# Patient Record
Sex: FEMALE | Race: WHITE | Employment: OTHER | URBAN - METROPOLITAN AREA
[De-identification: names, ages, dates, MRNs, and addresses within clinical notes are randomized per-mention and may not be internally consistent; named-entity substitution may affect disease eponyms.]

---

## 2020-08-06 ENCOUNTER — APPOINTMENT (OUTPATIENT)
Dept: GENERAL RADIOLOGY | Age: 70
End: 2020-08-06
Payer: MEDICARE

## 2020-08-06 ENCOUNTER — HOSPITAL ENCOUNTER (EMERGENCY)
Age: 70
Discharge: HOME | End: 2020-08-06
Payer: MEDICARE

## 2020-08-06 VITALS
DIASTOLIC BLOOD PRESSURE: 103 MMHG | RESPIRATION RATE: 16 BRPM | TEMPERATURE: 98.4 F | OXYGEN SATURATION: 98 % | HEART RATE: 108 BPM | SYSTOLIC BLOOD PRESSURE: 195 MMHG

## 2020-08-06 PROCEDURE — 72100 X-RAY EXAM L-S SPINE 2/3 VWS: CPT

## 2020-08-06 PROCEDURE — 6370000000 HC RX 637 (ALT 250 FOR IP): Performed by: PHYSICIAN ASSISTANT

## 2020-08-06 PROCEDURE — 99283 EMERGENCY DEPT VISIT LOW MDM: CPT

## 2020-08-06 PROCEDURE — 73502 X-RAY EXAM HIP UNI 2-3 VIEWS: CPT

## 2020-08-06 RX ORDER — ACETAMINOPHEN 500 MG
1000 TABLET ORAL ONCE
Status: COMPLETED | OUTPATIENT
Start: 2020-08-06 | End: 2020-08-06

## 2020-08-06 RX ORDER — PREDNISONE 20 MG/1
TABLET ORAL
Qty: 18 TABLET | Refills: 0 | Status: SHIPPED | OUTPATIENT
Start: 2020-08-06 | End: 2020-09-29 | Stop reason: ALTCHOICE

## 2020-08-06 RX ORDER — PREDNISONE 20 MG/1
60 TABLET ORAL ONCE
Status: COMPLETED | OUTPATIENT
Start: 2020-08-06 | End: 2020-08-06

## 2020-08-06 RX ORDER — HYDROCODONE BITARTRATE AND ACETAMINOPHEN 5; 325 MG/1; MG/1
1 TABLET ORAL EVERY 6 HOURS PRN
Qty: 10 TABLET | Refills: 0 | Status: SHIPPED | OUTPATIENT
Start: 2020-08-06 | End: 2020-08-09

## 2020-08-06 RX ADMIN — ACETAMINOPHEN 1000 MG: 500 TABLET, FILM COATED ORAL at 14:34

## 2020-08-06 RX ADMIN — PREDNISONE 60 MG: 20 TABLET ORAL at 14:34

## 2020-08-06 ASSESSMENT — PAIN SCALES - GENERAL
PAINLEVEL_OUTOF10: 5
PAINLEVEL_OUTOF10: 7
PAINLEVEL_OUTOF10: 7

## 2020-08-06 ASSESSMENT — PAIN DESCRIPTION - PAIN TYPE
TYPE: ACUTE PAIN
TYPE: ACUTE PAIN

## 2020-08-06 ASSESSMENT — ENCOUNTER SYMPTOMS
VOMITING: 0
ABDOMINAL PAIN: 0
SHORTNESS OF BREATH: 0
NAUSEA: 0

## 2020-08-06 ASSESSMENT — PAIN DESCRIPTION - DESCRIPTORS
DESCRIPTORS: SHOOTING
DESCRIPTORS: SHOOTING

## 2020-08-06 ASSESSMENT — PAIN DESCRIPTION - LOCATION
LOCATION: HIP
LOCATION: HIP

## 2020-08-06 ASSESSMENT — PAIN DESCRIPTION - ORIENTATION
ORIENTATION: LEFT
ORIENTATION: LEFT

## 2020-08-06 ASSESSMENT — PAIN DESCRIPTION - PROGRESSION
CLINICAL_PROGRESSION: GRADUALLY WORSENING
CLINICAL_PROGRESSION: GRADUALLY IMPROVING

## 2020-08-06 ASSESSMENT — PAIN DESCRIPTION - ONSET
ONSET: PROGRESSIVE
ONSET: PROGRESSIVE

## 2020-08-06 ASSESSMENT — PAIN DESCRIPTION - FREQUENCY
FREQUENCY: CONTINUOUS
FREQUENCY: CONTINUOUS

## 2020-08-06 NOTE — ED PROVIDER NOTES
[address] [facility]        Pt Name: Nat Kelly  MRN: [id number]  [place] 1950  Date of evaluation: 8/6/2020  Provider: SABINE Ramos    This patient was not seen and evaluated by the attending physician No att. providers found. CHIEF COMPLAINT       Chief Complaint   Patient presents with    Hip Pain     NKI. x1 week. worsening pain. pt tachy,         HISTORY OF PRESENT ILLNESS  (Location/Symptom, Timing/Onset, Context/Setting, Quality,Duration, Modifying Factors, Severity.)   Nat Kelly is a 79 y.o. female who presents tot emergency department for left hip and leg pain for the past week. She reports initially started as low back pain but that went away. Now she has left hip pain that radiates down the side of her left leg to the knee. She reports is been constant. Has been taking ibuprofen 800 mg 3 times a day which does help. Her PCP did call in Zanaflex which does not help. She did see her chiropractor today and yesterday and he did adjustments. Reports chiropractor was going to give her a Medrol Dosepak but with her hip pain thought she should get checked out. Patient denies any injuries or prior episodes of this. She denies fever, chills, chest pain, shortness of breath, nausea, vomiting, abdominal pain. She denies bowel bladder incontinence, urinary retention, saddle anesthesia, IV drug use. Denies any numbness or tingling. Only health problem is hypertension. Reports she is pretty healthy. She denies any history of diabetes. Nursing Notes were reviewed and I agree. REVIEW OF SYSTEMS    (2-9 systems for level 4, 10 or more for level 5)     Review of Systems   Constitutional: Negative for chills and fever. Respiratory: Negative for shortness of breath. Cardiovascular: Negative for chest pain. Gastrointestinal: Negative for abdominal pain, nausea and vomiting.    Musculoskeletal: Positive for arthralgias. Neurological:        Negative for bowel or bladder incontinence, saddle anesthesia, urinary retention, numbness, tingling, IVDU     Except as noted above the remainder of the review of systems was reviewed and negative. PAST MEDICAL HISTORY         Diagnosis Date    Hypertension        SURGICAL HISTORY           Procedure Laterality Date    CYST REMOVAL      TONSILLECTOMY      TUBAL LIGATION         CURRENT MEDICATIONS       Discharge Medication List as of 8/6/2020  4:08 PM      CONTINUE these medications which have NOT CHANGED    Details   lisinopril (PRINIVIL;ZESTRIL) 5 MG tablet Take 5 mg by mouth daily      Cholecalciferol (VITAMIN D3) 2000 UNITS CAPS Take 1,000 Units by mouth daily      Multiple Vitamins-Minerals (MULTIVITAMIN ADULT PO) Take 1 tablet by mouth daily      B Complex Vitamins (VITAMIN B COMPLEX PO) Take 1 tablet by mouth daily      ondansetron (ZOFRAN) 4 MG tablet Take 1 tablet by mouth every 8 hours as needed for Nausea, Disp-10 tablet, R-0             ALLERGIES     Amoxicillin    FAMILY HISTORY     History reviewed. No pertinent family history. No family status information on file. SOCIAL HISTORY      reports that she quit smoking about 16 years ago. She has never used smokeless tobacco. She reports that she does not drink alcohol or use drugs. PHYSICAL EXAM    (up to 7 for level 4, 8 or more for level 5)     ED Triage Vitals [08/06/20 1324]   BP Temp Temp Source Pulse Resp SpO2 Height Weight   (!) 172/95 98.4 °F (36.9 °C) Oral 124 19 99 % -- --       Physical Exam  Constitutional:       General: She is not in acute distress. Appearance: Normal appearance. She is not ill-appearing, toxic-appearing or diaphoretic. HENT:      Head: Normocephalic and atraumatic. Nose: Nose normal.   Neck:      Musculoskeletal: Normal range of motion and neck supple. Cardiovascular:      Rate and Rhythm: Normal rate and regular rhythm.       Heart sounds: Normal TempSrc: Oral     SpO2: 99% 98% 98%       Patient was nontoxic, well appearing, afebrile with normal vital signs with the exception of hypertension 172/95. Also tachycardia 124. Patient reports this is normal for her when she goes to the doctor. Reports when she goes to her PCP, they have to let her sit and calm down for a while before they will take her vital signs. Patient is not concerned about the tachycardia. Will monitor here. She denies bowel bladder incontinence, saddle anesthesia, urinary retention, numbness, tingling, IV drug use. No red flag signs on exam.  Have a low suspicion for cauda equina or cord compression. I do not believe she warrants emergent MRI. Given  Prednisone and Tylenol. X-ray lumbar with no acute abnormality. Has moderate degenerative change. Xray of the left hip is negative. Upon my reevaluation, her heart rate is 93. Suspect she has sciatica. Will dc with prednisone and few norco.  Discussed to stop ibuprofen since she will be on prednisone. Discussed she can supplement with Tylenol but not to exceed 3g a day including the 325 mg in norco.  FU with PCP in next few days for reeval and return for worsening. She agreed and understood      I estimate there is LOW risk for ABDOMINAL AORTIC ANEURYSM, CAUDA EQUINA SYNDROME, EPIDURAL MASS LESION, OR CORD COMPRESSION, thus I consider the discharge disposition reasonable. PROCEDURES:  None    FINAL IMPRESSION      1.  Sciatica of left side          DISPOSITION/PLAN   DISPOSITIONDecision To Discharge 08/06/2020 03:59:58 PM      PATIENT REFERRED TO:  Shikha Aviles  [address] [place] [address] [address]  [address] [facility] [address]  [phone]    Schedule an appointment as soon as possible for a visit in 3 days  for reevaluation    [facility] Emergency Department  [address] [facility]  [phone]    As needed, If symptoms worsen      DISCHARGE MEDICATIONS:  Discharge Medication List as of 8/6/2020  4:08 PM START taking these medications    Details   predniSONE (DELTASONE) 20 MG tablet Take 3 tabs for 3 days, then 2 tabs for 3 days, then 1 tab for 3 days, Disp-18 tablet,R-0Print      HYDROcodone-acetaminophen (NORCO) 5-325 MG per tablet Take 1 tablet by mouth every 6 hours as needed for Pain for up to 3 days. , Disp-10 tablet,R-0Print             (Please note that portions of this note were completed with a voice recognition program.  Efforts were [place] [facility] edit the dictations but occasionally words are mis-transcribed.)    [place], [address] [place] [address]  08/06/20 9544

## 2020-08-06 NOTE — ED NOTES
Pt verbalizes that when in presence of healthcare providers, both her BP and her HR elevate every time. MLP notified.       Darcy Vazquez RN  08/06/20 9678

## 2020-09-29 ENCOUNTER — OFFICE VISIT (OUTPATIENT)
Dept: ORTHOPEDIC SURGERY | Age: 70
End: 2020-09-29
Payer: MEDICARE

## 2020-09-29 VITALS — TEMPERATURE: 98.1 F

## 2020-09-29 PROBLEM — M48.061 SPINAL STENOSIS OF LUMBAR REGION WITHOUT NEUROGENIC CLAUDICATION: Status: ACTIVE | Noted: 2020-09-29

## 2020-09-29 PROCEDURE — G8427 DOCREV CUR MEDS BY ELIG CLIN: HCPCS | Performed by: PHYSICIAN ASSISTANT

## 2020-09-29 PROCEDURE — 99203 OFFICE O/P NEW LOW 30 MIN: CPT | Performed by: PHYSICIAN ASSISTANT

## 2020-09-29 PROCEDURE — G8421 BMI NOT CALCULATED: HCPCS | Performed by: PHYSICIAN ASSISTANT

## 2020-09-29 PROCEDURE — 1090F PRES/ABSN URINE INCON ASSESS: CPT | Performed by: PHYSICIAN ASSISTANT

## 2020-09-29 NOTE — PROGRESS NOTES
New Patient: LUMBAR SPINE    Referring Provider:  Lula Loomis MD    CHIEF COMPLAINT:    Chief Complaint   Patient presents with    Back Pain     low back pain       HISTORY OF PRESENT ILLNESS:                 The patient is being seen in consultation at the kind request of Lula Loomis MD.  The patient is a 79 y.o. female whom reports acute onset of low back pain and lumbar radicular pain 7/31/2020. Symptoms got severe enough that she presented to the ER on 8/6/2020. There she was prescribed Medrol Dosepak which seemed to provide a little bit of relief. Since that time she has been prescribed ibuprofen 800 mg 3 times daily which she states is also helping with her symptoms. She did undergo a MRI of the lumbar spine 9/18/2020 which demonstrated lumbar degenerative changes with severe stenosis at L2-3 due to a left paracentral protrusion, osteophytic spurs and disc extrusion. Also noted to have moderate to severe spinal stenosis at L4-5 due to bony changes and facet arthropathy. Current/Past Treatment:   · Physical Therapy: None  · Chiropractic: None  · Injection: None  · Medications: Medrol Dosepak with mild temporary relief. Currently on ibuprofen 800 mg 3 times daily with significant relief.   · Surgery/Consult: None      Past Medical History:   Past Medical History:   Diagnosis Date    Hypertension       Past Surgical History:     Past Surgical History:   Procedure Laterality Date    CYST REMOVAL      TONSILLECTOMY      TUBAL LIGATION       Current Medications:     Current Outpatient Medications:     lisinopril (PRINIVIL;ZESTRIL) 5 MG tablet, Take 5 mg by mouth daily, Disp: , Rfl:     Cholecalciferol (VITAMIN D3) 2000 UNITS CAPS, Take 1,000 Units by mouth daily, Disp: , Rfl:     Multiple Vitamins-Minerals (MULTIVITAMIN ADULT PO), Take 1 tablet by mouth daily, Disp: , Rfl:     B Complex Vitamins (VITAMIN B COMPLEX PO), Take 1 tablet by mouth daily, Disp: , Rfl:     ondansetron (ZOFRAN) 4 MG tablet, Take 1 tablet by mouth every 8 hours as needed for Nausea, Disp: 10 tablet, Rfl: 0  Allergies:  Amoxicillin  Social History:    reports that she quit smoking about 16 years ago. She has never used smokeless tobacco. She reports that she does not drink alcohol or use drugs. Family History:   History reviewed. No pertinent family history. REVIEW OF SYSTEMS: Full ROS noted & scanned   CONSTITUTIONAL: Denies unexplained weight loss, fevers, chills or fatigue  NEUROLOGICAL: Denies unsteady gait or progressive weakness  MUSCULOSKELETAL: Denies joint swelling or redness  PSYCHOLOGICAL: Denies anxiety, depression   SKIN: Denies skin changes, delayed healing, rash, itching   HEMATOLOGIC: Denies easy bleeding or bruising  ENDOCRINE: Denies excessive thirst, urination, heat/cold  RESPIRATORY: Denies current dyspnea, cough  GI: Denies nausea, vomiting, diarrhea   : Denies bowel or bladder issues       PHYSICAL EXAM:    Vitals: Temperature 98.1 °F (36.7 °C), temperature source Temporal.    GENERAL EXAM:  · General Apparence: Patient is adequately groomed with no evidence of malnutrition. · Orientation: The patient is oriented to time, place and person. · Mood & Affect:The patient's mood and affect are appropriate   · Vascular: Examination reveals no swelling tenderness in upper or lower extremities. Good capillary refill  · Lymphatic: The lymphatic examination bilaterally reveals all areas to be without enlargement or induration  · Sensation: Sensation is intact without deficit  · Coordination/Balance: Good coordination       LUMBAR/SACRAL EXAMINATION:  · Inspection: Local inspection shows no step-off or bruising. Lumbar alignment is normal.  Sagittal and Coronal balance is neutral.      · Palpation:   No evidence of tenderness at the midline. No tenderness bilaterally at the paraspinal or trochanters. There is no step-off or paraspinal spasm.    · Range of Motion: Decreased forward flexion which reproduces back pain and some loss of extension which does not cause pain. · Strength:   Strength testing is 5/5 in all muscle groups tested. · Special Tests:   Straight leg raise and crossed SLR negative. Leg length and pelvis level.  0 out of 5 Kallie's signs. · Skin: There are no rashes, ulcerations or lesions. · Reflexes: Reflexes are symmetrically 2+ at the patellar and ankle tendons. Clonus absent bilaterally at the feet. · Gait & station: normal, patient ambulates without assistance      · Additional Examinations:   · RIGHT LOWER EXTREMITY: Inspection/examination of the right lower extremity does not show any tenderness, deformity or injury. Range of motion is unremarkable. There is no gross instability. There are no rashes, ulcerations or lesions. Strength and tone are normal.      · LEFT LOWER EXTREMITY:  Inspection/examination of the left lower extremity does not show any tenderness, deformity or injury. Range of motion is unremarkable. There is no gross instability. There are no rashes, ulcerations or lesions. Strength and tone are normal.    Diagnostic Testing:    MRI: MRI dated 9/18/2020 [facility]  Impression:   1. Lumbar degenerative changes with severe spinal stenosis at L2-3 due to a left paracentral protrusion osteophytic spurs and disc extrusion. 2. Moderate to severe spinal stenosis at L4-5 also noted due to bony changes and facet arthropathy. Impression:   Low back pain and leg pain with initial onset 7/31 felt to be related to lumbar disc herniation of L2-3 as well as multilevel spinal stenosis. Her symptoms have markedly improved with oral medications. There is no neurological deficits noted. No surgical indication at this time. 1. Spinal stenosis of lumbar region without neurogenic claudication    2. HNP (herniated nucleus pulposus), lumbar        Plan:    1. Medications -continue ibuprofen as prescribed by her primary care physician.   Recommended monitoring CBC and renal function. 2. PT -PT Rx provided today. 3. Further imaging -none indicated at this time. 4. Interventional -possible epidural steroid injection in the future if symptoms return. 5. Follow up -6-8 weeks. Call or return to clinic prn if these symptoms worsen or fail to improve as anticipated. Old records were reviewed. I did spend 40 minutes in the office today with greater than 50% of this time counseling, reviewing diagnostic tests, face to face discussion concerning their diagnosis and treatment options. All of their questions were answered.

## 2020-11-13 ENCOUNTER — HOSPITAL ENCOUNTER (OUTPATIENT)
Dept: NUCLEAR MEDICINE | Age: 70
Discharge: HOME | End: 2020-11-13
Payer: MEDICARE

## 2020-11-13 PROCEDURE — A9562 TC99M MERTIATIDE: HCPCS | Performed by: UROLOGY

## 2020-11-13 PROCEDURE — 3430000000 HC RX DIAGNOSTIC RADIOPHARMACEUTICAL: Performed by: UROLOGY

## 2020-11-13 PROCEDURE — 78708 K FLOW/FUNCT IMAGE W/DRUG: CPT

## 2020-11-13 RX ADMIN — TECHNESCAN TC 99M MERTIATIDE 10 MILLICURIE: 1 INJECTION, POWDER, LYOPHILIZED, FOR SOLUTION INTRAVENOUS at 08:33

## 2020-12-04 ENCOUNTER — HOSPITAL ENCOUNTER (OUTPATIENT)
Dept: ULTRASOUND IMAGING | Age: 70
Discharge: HOME | End: 2020-12-04
Payer: MEDICARE

## 2020-12-04 ENCOUNTER — HOSPITAL ENCOUNTER (OUTPATIENT)
Dept: CT IMAGING | Age: 70
Discharge: HOME | End: 2020-12-04
Payer: MEDICARE

## 2020-12-04 VITALS
WEIGHT: 136.47 LBS | SYSTOLIC BLOOD PRESSURE: 157 MMHG | TEMPERATURE: 97.4 F | DIASTOLIC BLOOD PRESSURE: 101 MMHG | BODY MASS INDEX: 25.11 KG/M2 | OXYGEN SATURATION: 97 % | RESPIRATION RATE: 20 BRPM | HEIGHT: 62 IN | HEART RATE: 98 BPM

## 2020-12-04 LAB
HCT VFR BLD CALC: 40.3 % (ref 36–48)
HEMOGLOBIN: 13.8 G/DL (ref 12–16)
INR BLD: 0.94 (ref 0.86–1.14)
MCH RBC QN AUTO: 29.7 PG (ref 26–34)
MCHC RBC AUTO-ENTMCNC: 34.1 G/DL (ref 31–36)
MCV RBC AUTO: 87.1 FL (ref 80–100)
PDW BLD-RTO: 14.2 % (ref 12.4–15.4)
PLATELET # BLD: 347 K/UL (ref 135–450)
PMV BLD AUTO: 7.1 FL (ref 5–10.5)
PROTHROMBIN TIME: 10.9 SEC (ref 10–13.2)
RBC # BLD: 4.63 M/UL (ref 4–5.2)
WBC # BLD: 7.9 K/UL (ref 4–11)

## 2020-12-04 PROCEDURE — 2500000003 HC RX 250 WO HCPCS: Performed by: RADIOLOGY

## 2020-12-04 PROCEDURE — 36415 COLL VENOUS BLD VENIPUNCTURE: CPT

## 2020-12-04 PROCEDURE — 76380 CAT SCAN FOLLOW-UP STUDY: CPT

## 2020-12-04 PROCEDURE — 7100000010 HC PHASE II RECOVERY - FIRST 15 MIN

## 2020-12-04 PROCEDURE — 85027 COMPLETE CBC AUTOMATED: CPT

## 2020-12-04 PROCEDURE — 76775 US EXAM ABDO BACK WALL LIM: CPT

## 2020-12-04 PROCEDURE — 7100000011 HC PHASE II RECOVERY - ADDTL 15 MIN

## 2020-12-04 PROCEDURE — 85610 PROTHROMBIN TIME: CPT

## 2020-12-04 PROCEDURE — 6360000002 HC RX W HCPCS: Performed by: RADIOLOGY

## 2020-12-04 RX ORDER — MIDAZOLAM HYDROCHLORIDE 1 MG/ML
INJECTION INTRAMUSCULAR; INTRAVENOUS DAILY PRN
Status: COMPLETED | OUTPATIENT
Start: 2020-12-04 | End: 2020-12-04

## 2020-12-04 RX ORDER — FENTANYL CITRATE 50 UG/ML
INJECTION, SOLUTION INTRAMUSCULAR; INTRAVENOUS DAILY PRN
Status: COMPLETED | OUTPATIENT
Start: 2020-12-04 | End: 2020-12-04

## 2020-12-04 RX ADMIN — FENTANYL CITRATE 50 MCG: 50 INJECTION INTRAMUSCULAR; INTRAVENOUS at 11:04

## 2020-12-04 RX ADMIN — FAMOTIDINE 20 MG: 10 INJECTION, SOLUTION INTRAVENOUS at 10:45

## 2020-12-04 RX ADMIN — MIDAZOLAM 1 MG: 1 INJECTION INTRAMUSCULAR; INTRAVENOUS at 11:04

## 2020-12-04 ASSESSMENT — PAIN SCALES - GENERAL
PAINLEVEL_OUTOF10: 0
PAINLEVEL_OUTOF10: 0

## 2020-12-04 ASSESSMENT — PAIN - FUNCTIONAL ASSESSMENT
PAIN_FUNCTIONAL_ASSESSMENT: 0-10
PAIN_FUNCTIONAL_ASSESSMENT: ACTIVITIES ARE NOT PREVENTED

## 2020-12-04 ASSESSMENT — PAIN DESCRIPTION - DESCRIPTORS: DESCRIPTORS: ACHING;CONSTANT

## 2020-12-04 NOTE — PRE SEDATION
disease    Sedation/ Anesthesia Plan:   intravenous sedation    Medications Planned:   midazolam (Versed) intravenously and fentanyl intravenously    Patient is an appropriate candidate for plan of sedation: yes    Electronically signed by Leonora Nieves MD on 12/4/2020 at 11:04 AM

## 2020-12-04 NOTE — PROGRESS NOTES
Ambulatory Surgery/Procedure Discharge Note    Vitals:    12/04/20 1206   BP: (!) 157/101   Pulse: 98   Resp:    Temp: 97.4 °F (36.3 °C)   SpO2: 97%       No intake/output data recorded. Restroom use offered before discharge. Yes    Pain assessment:  none  Pain Level: 0    Procedure cancelled. Sedation instructions given. Verbalizes understanding       Patient discharged to home/self care.  Patient discharged via wheel chair by transporter to waiting family/S.O.       12/4/2020 12:28 PM

## 2020-12-04 NOTE — BRIEF OP NOTE
Brief Postoperative Note    Karen Hawk  YOB: 1950  [id number]    The procedure was cancelled. Upon further review of the outside imaging, there appears to be some pooling of contrast in the area of concern superior pole right kidney. This may represent a calyceal cyst.  No abnormality was identified in this region on US and non contrast CT, so no procedure was performed. Recommend further evaluation with MRI renal protocol with contrast.  Findings were discussed with Dr. Martha Rawls.     Electronically signed by Baron Oneil MD on 12/4/2020 at 11:21 AM

## 2021-03-03 ENCOUNTER — IMMUNIZATION (OUTPATIENT)
Dept: PRIMARY CARE CLINIC | Age: 71
End: 2021-03-03
Payer: MEDICARE

## 2021-03-03 PROCEDURE — 91301 COVID-19, MODERNA VACCINE 100MCG/0.5ML DOSE: CPT | Performed by: FAMILY MEDICINE

## 2021-03-03 PROCEDURE — 0011A COVID-19, MODERNA VACCINE 100MCG/0.5ML DOSE: CPT | Performed by: FAMILY MEDICINE

## 2021-03-31 ENCOUNTER — IMMUNIZATION (OUTPATIENT)
Dept: PRIMARY CARE CLINIC | Age: 71
End: 2021-03-31
Payer: MEDICARE

## 2021-03-31 PROCEDURE — 0012A COVID-19, MODERNA VACCINE 100MCG/0.5ML DOSE: CPT | Performed by: FAMILY MEDICINE

## 2021-03-31 PROCEDURE — 91301 COVID-19, MODERNA VACCINE 100MCG/0.5ML DOSE: CPT | Performed by: FAMILY MEDICINE

## 2022-06-03 ENCOUNTER — HOSPITAL ENCOUNTER (OUTPATIENT)
Dept: GENERAL RADIOLOGY | Age: 72
Discharge: HOME | End: 2022-06-03
Payer: MEDICARE

## 2022-06-03 ENCOUNTER — HOSPITAL ENCOUNTER (OUTPATIENT)
Dept: MRI IMAGING | Age: 72
Discharge: HOME | End: 2022-06-03
Payer: MEDICARE

## 2022-06-03 ENCOUNTER — HOSPITAL ENCOUNTER (OUTPATIENT)
Age: 72
Discharge: HOME | End: 2022-06-03
Payer: MEDICARE

## 2022-06-03 DIAGNOSIS — M54.16 LUMBAR RADICULOPATHY: ICD-10-CM

## 2022-06-03 PROCEDURE — 72110 X-RAY EXAM L-2 SPINE 4/>VWS: CPT

## 2022-06-03 PROCEDURE — 72148 MRI LUMBAR SPINE W/O DYE: CPT

## 2022-09-10 ENCOUNTER — HOSPITAL ENCOUNTER (OUTPATIENT)
Dept: CT IMAGING | Age: 72
Discharge: HOME | End: 2022-09-10
Payer: MEDICARE

## 2022-09-10 DIAGNOSIS — M43.06 LUMBAR SPONDYLOLYSIS: ICD-10-CM

## 2022-09-10 DIAGNOSIS — M48.061 SPINAL STENOSIS OF LUMBAR REGION, UNSPECIFIED WHETHER NEUROGENIC CLAUDICATION PRESENT: ICD-10-CM

## 2022-09-10 PROCEDURE — 72131 CT LUMBAR SPINE W/O DYE: CPT

## 2022-11-10 ENCOUNTER — HOSPITAL ENCOUNTER (INPATIENT)
Age: 72
LOS: 8 days | Discharge: HOME HEALTH | DRG: 561 | End: 2022-11-18
Attending: PHYSICAL MEDICINE & REHABILITATION | Admitting: PHYSICAL MEDICINE & REHABILITATION
Payer: MEDICARE

## 2022-11-10 DIAGNOSIS — M43.26 FUSION OF SPINE OF LUMBAR REGION: ICD-10-CM

## 2022-11-10 DIAGNOSIS — M48.061 SPINAL STENOSIS OF LUMBAR REGION WITHOUT NEUROGENIC CLAUDICATION: Primary | ICD-10-CM

## 2022-11-10 PROCEDURE — 6360000002 HC RX W HCPCS: Performed by: PHYSICAL MEDICINE & REHABILITATION

## 2022-11-10 PROCEDURE — 1280000000 HC REHAB R&B

## 2022-11-10 PROCEDURE — 94760 N-INVAS EAR/PLS OXIMETRY 1: CPT

## 2022-11-10 PROCEDURE — 6370000000 HC RX 637 (ALT 250 FOR IP): Performed by: PHYSICAL MEDICINE & REHABILITATION

## 2022-11-10 RX ORDER — ONDANSETRON 4 MG/1
4 TABLET, ORALLY DISINTEGRATING ORAL EVERY 8 HOURS PRN
Status: ON HOLD | COMMUNITY
Start: 2022-11-10 | End: 2022-11-17

## 2022-11-10 RX ORDER — ONDANSETRON 4 MG/1
4 TABLET, FILM COATED ORAL EVERY 8 HOURS PRN
Status: DISCONTINUED | OUTPATIENT
Start: 2022-11-10 | End: 2022-11-18 | Stop reason: HOSPADM

## 2022-11-10 RX ORDER — HYDROCODONE BITARTRATE AND ACETAMINOPHEN 5; 325 MG/1; MG/1
1-2 TABLET ORAL EVERY 4 HOURS PRN
Status: ON HOLD | COMMUNITY
Start: 2022-11-10 | End: 2022-11-17

## 2022-11-10 RX ORDER — ATORVASTATIN CALCIUM 10 MG/1
10 TABLET, FILM COATED ORAL NIGHTLY
COMMUNITY
Start: 2022-08-02

## 2022-11-10 RX ORDER — DIAZEPAM 5 MG/1
5 TABLET ORAL EVERY 8 HOURS PRN
Status: DISCONTINUED | OUTPATIENT
Start: 2022-11-10 | End: 2022-11-18 | Stop reason: HOSPADM

## 2022-11-10 RX ORDER — DIPHENHYDRAMINE HCL 25 MG
25 CAPSULE ORAL NIGHTLY PRN
COMMUNITY

## 2022-11-10 RX ORDER — VITAMIN B COMPLEX
2000 TABLET ORAL DAILY
Status: DISCONTINUED | OUTPATIENT
Start: 2022-11-11 | End: 2022-11-18 | Stop reason: HOSPADM

## 2022-11-10 RX ORDER — LISINOPRIL 40 MG/1
40 TABLET ORAL DAILY
Status: DISCONTINUED | OUTPATIENT
Start: 2022-11-11 | End: 2022-11-18 | Stop reason: HOSPADM

## 2022-11-10 RX ORDER — METOPROLOL SUCCINATE 25 MG/1
25 TABLET, EXTENDED RELEASE ORAL DAILY
Status: DISCONTINUED | OUTPATIENT
Start: 2022-11-11 | End: 2022-11-18 | Stop reason: HOSPADM

## 2022-11-10 RX ORDER — PANTOPRAZOLE SODIUM 40 MG/1
40 TABLET, DELAYED RELEASE ORAL
Status: DISCONTINUED | OUTPATIENT
Start: 2022-11-11 | End: 2022-11-18 | Stop reason: HOSPADM

## 2022-11-10 RX ORDER — HYDROCODONE BITARTRATE AND ACETAMINOPHEN 5; 325 MG/1; MG/1
2 TABLET ORAL EVERY 4 HOURS PRN
Status: DISCONTINUED | OUTPATIENT
Start: 2022-11-10 | End: 2022-11-18 | Stop reason: HOSPADM

## 2022-11-10 RX ORDER — ENOXAPARIN SODIUM 100 MG/ML
40 INJECTION SUBCUTANEOUS NIGHTLY
Status: DISCONTINUED | OUTPATIENT
Start: 2022-11-10 | End: 2022-11-18 | Stop reason: HOSPADM

## 2022-11-10 RX ORDER — ATORVASTATIN CALCIUM 10 MG/1
10 TABLET, FILM COATED ORAL NIGHTLY
Status: DISCONTINUED | OUTPATIENT
Start: 2022-11-10 | End: 2022-11-18 | Stop reason: HOSPADM

## 2022-11-10 RX ORDER — DIPHENHYDRAMINE HCL 25 MG
25 TABLET ORAL NIGHTLY PRN
Status: DISCONTINUED | OUTPATIENT
Start: 2022-11-10 | End: 2022-11-18 | Stop reason: HOSPADM

## 2022-11-10 RX ORDER — BISACODYL 10 MG
10 SUPPOSITORY, RECTAL RECTAL DAILY PRN
Status: DISCONTINUED | OUTPATIENT
Start: 2022-11-10 | End: 2022-11-18 | Stop reason: HOSPADM

## 2022-11-10 RX ORDER — M-VIT,TX,IRON,MINS/CALC/FOLIC 27MG-0.4MG
1 TABLET ORAL DAILY
Status: DISCONTINUED | OUTPATIENT
Start: 2022-11-11 | End: 2022-11-18 | Stop reason: HOSPADM

## 2022-11-10 RX ORDER — ACETAMINOPHEN 325 MG/1
650 TABLET ORAL EVERY 4 HOURS PRN
COMMUNITY
Start: 2022-11-10

## 2022-11-10 RX ORDER — POLYVINYL ALCOHOL 14 MG/ML
1 SOLUTION/ DROPS OPHTHALMIC 2 TIMES DAILY
Status: DISCONTINUED | OUTPATIENT
Start: 2022-11-10 | End: 2022-11-18 | Stop reason: HOSPADM

## 2022-11-10 RX ORDER — ACETAMINOPHEN 325 MG/1
650 TABLET ORAL EVERY 4 HOURS PRN
Status: DISCONTINUED | OUTPATIENT
Start: 2022-11-10 | End: 2022-11-18 | Stop reason: HOSPADM

## 2022-11-10 RX ORDER — POLYETHYLENE GLYCOL 3350 17 G/17G
17 POWDER, FOR SOLUTION ORAL DAILY
Status: DISCONTINUED | OUTPATIENT
Start: 2022-11-10 | End: 2022-11-15

## 2022-11-10 RX ORDER — OMEPRAZOLE 20 MG/1
20 CAPSULE, DELAYED RELEASE ORAL DAILY
COMMUNITY

## 2022-11-10 RX ORDER — HYDROCODONE BITARTRATE AND ACETAMINOPHEN 5; 325 MG/1; MG/1
1 TABLET ORAL EVERY 6 HOURS PRN
Status: DISCONTINUED | OUTPATIENT
Start: 2022-11-10 | End: 2022-11-12

## 2022-11-10 RX ORDER — VITAMIN B COMPLEX
1 CAPSULE ORAL DAILY
Status: DISCONTINUED | OUTPATIENT
Start: 2022-11-11 | End: 2022-11-18 | Stop reason: HOSPADM

## 2022-11-10 RX ORDER — POLYETHYLENE GLYCOL 3350 17 G/17G
17 POWDER, FOR SOLUTION ORAL DAILY
Status: ON HOLD | COMMUNITY
Start: 2022-11-11 | End: 2022-11-17 | Stop reason: HOSPADM

## 2022-11-10 RX ORDER — DIAZEPAM 5 MG/1
5 TABLET ORAL EVERY 8 HOURS PRN
Status: ON HOLD | COMMUNITY
Start: 2022-05-23 | End: 2022-11-17

## 2022-11-10 RX ORDER — METOPROLOL SUCCINATE 25 MG/1
25 TABLET, EXTENDED RELEASE ORAL
COMMUNITY
Start: 2022-09-01

## 2022-11-10 RX ORDER — SENNA AND DOCUSATE SODIUM 50; 8.6 MG/1; MG/1
2 TABLET, FILM COATED ORAL 2 TIMES DAILY
Status: DISCONTINUED | OUTPATIENT
Start: 2022-11-10 | End: 2022-11-15

## 2022-11-10 RX ORDER — LACTOBACILLUS RHAMNOSUS GG 10B CELL
1 CAPSULE ORAL
Status: DISCONTINUED | OUTPATIENT
Start: 2022-11-11 | End: 2022-11-18 | Stop reason: HOSPADM

## 2022-11-10 RX ADMIN — ONDANSETRON HYDROCHLORIDE 4 MG: 4 TABLET, FILM COATED ORAL at 14:00

## 2022-11-10 RX ADMIN — HYDROCODONE BITARTRATE AND ACETAMINOPHEN 2 TABLET: 5; 325 TABLET ORAL at 19:41

## 2022-11-10 RX ADMIN — ENOXAPARIN SODIUM 40 MG: 100 INJECTION SUBCUTANEOUS at 19:41

## 2022-11-10 RX ADMIN — HYDROCODONE BITARTRATE AND ACETAMINOPHEN 2 TABLET: 5; 325 TABLET ORAL at 15:24

## 2022-11-10 RX ADMIN — ATORVASTATIN CALCIUM 10 MG: 10 TABLET, FILM COATED ORAL at 19:40

## 2022-11-10 RX ADMIN — SENNOSIDES AND DOCUSATE SODIUM 2 TABLET: 50; 8.6 TABLET ORAL at 20:07

## 2022-11-10 RX ADMIN — POLYVINYL ALCOHOL 1 DROP: 14 SOLUTION/ DROPS OPHTHALMIC at 20:09

## 2022-11-10 ASSESSMENT — PAIN DESCRIPTION - DESCRIPTORS: DESCRIPTORS: ACHING

## 2022-11-10 ASSESSMENT — PAIN - FUNCTIONAL ASSESSMENT
PAIN_FUNCTIONAL_ASSESSMENT: ACTIVITIES ARE NOT PREVENTED
PAIN_FUNCTIONAL_ASSESSMENT: ACTIVITIES ARE NOT PREVENTED

## 2022-11-10 ASSESSMENT — PAIN SCALES - GENERAL
PAINLEVEL_OUTOF10: 10
PAINLEVEL_OUTOF10: 5
PAINLEVEL_OUTOF10: 7

## 2022-11-10 ASSESSMENT — PAIN DESCRIPTION - PAIN TYPE: TYPE: ACUTE PAIN

## 2022-11-10 ASSESSMENT — PAIN DESCRIPTION - ORIENTATION
ORIENTATION: RIGHT
ORIENTATION: LEFT

## 2022-11-10 ASSESSMENT — PAIN DESCRIPTION - LOCATION
LOCATION: LEG
LOCATION: LEG;BACK

## 2022-11-10 ASSESSMENT — PAIN DESCRIPTION - FREQUENCY: FREQUENCY: CONTINUOUS

## 2022-11-10 ASSESSMENT — PAIN DESCRIPTION - ONSET: ONSET: ON-GOING

## 2022-11-10 NOTE — PROGRESS NOTES
Department of [facility]  Dr. Christ Figueroa Progress Note  11/10/2022  12:41 PM    Patient Name:   Fahad Cagle  YOB: 1950    Diagnosis: Severe lumbar spinal stenosis, decompression and fusion surgery        Subjective: Patient is a 77-year-old female with a history of severe lumbar spinal stenosis causing low back and leg pain complaints that does not improve with conservative treatment. Patient was taken to surgery on 11/3 by Dr. Laxmi Kaplan at [facility] where she underwent L4-S1 anterior lumbar interbody fusion with pedicle screw fixation and laminectomy and decompression procedure. Postoperatively patient was started therapies, but needs more therapy before discharge to home safely. Patient also has history of hypertension and hyperlipidemia. Patient lives at home with her family in a 1 - level house. There were no vitals taken for this visit. Last 24 hour lab  No results found for this or any previous visit (from the past 24 hour(s)). Plan: We will plan to admit patient to our rehab unit tonight.       Dr. Christ Figueroa

## 2022-11-10 NOTE — PROGRESS NOTES
4 Eyes Skin Assessment     NAME:  Amanda Tim  YOB: 1950  MEDICAL RECORD NUMBER:  [id number]    The patient is being assess for  Admission    I agree that 2 RN's have performed a thorough Head to Toe Skin Assessment on the patient. ALL assessment sites listed below have been assessed. Areas assessed by both nurses:    Head, Face, Ears, Shoulders, Back, Chest, Arms, Elbows, Hands, Sacrum. Buttock, Coccyx, Ischium, and Legs. Feet and Heels        Does the Patient have a Wound?  No noted wound(s)       [place] Prevention initiated:  Yes   Wound Care Orders initiated:  No    Pressure Injury (Stage 3,4, Unstageable, DTI, NWPT, and Complex wounds) if present place referral/consult order under [de-identified] No    New and Established Ostomies if present place consult order under : No      Nurse 1 eSignature: Electronically signed by Devonte Fischer RN on 11/10/22 at 5:40 PM EST    **SHARE this note so that the co-signing nurse is able to place an eSignature**    Nurse 2 eSignature: Electronically signed by Neha Garcia RN on 11/10/22 at 5:52 PM EST

## 2022-11-10 NOTE — PROGRESS NOTES
Patient admitted to room 3273 per stretcher from [facility]. Patient was oriented to the Call Light, Phone, TV, Thermostat, Bed Controls, Bathroom and Emergency Cord. Patient verbalized and demonstrated understanding of all. Patient was also given an over view of Unit Routines for Acute Rehab, including what to wear for therapy. The patient's role in goal setting was reviewed along with an explanation of the Interdisciplinary Team meeting, the 's role in coordinating services and the Discharge Planning/Continuum of Care process. Patient Rights and Responsibilities were reviewed. Meal times were explained, including how to order food. The white board (used for communication) was pointed out emphasizing  the 3 hours/day Therapy Schedule (posted most evenings), the number (and process) for reporting grievances, and the Doctor's, Nurse's, and PCA's names. It was recommended that any family that will be care givers or any care givers the patient has, take part in therapy. There are no set visiting hours, and it was suggested that non-caregiver friends and family visitors come after therapy (at 4 PM or later) to allow patient to rest in between sessions.     Electronically signed by Stanley Pabon RN on 11/10/2022 at 3:15 PM

## 2022-11-10 NOTE — PLAN OF CARE
Problem: Discharge Planning  Goal: Discharge to home or other facility with appropriate resources  Outcome: Progressing  Flowsheets (Taken 11/10/2022 1520)  Discharge to home or other facility with appropriate resources:   Identify barriers to discharge with patient and caregiver   Identify discharge learning needs (meds, wound care, etc)   Refer to discharge planning if patient needs post-hospital services based on physician order or complex needs related to functional status, cognitive ability or social support system   Arrange for needed discharge resources and transportation as appropriate     Problem: Safety - Adult  Goal: Free from fall injury  Outcome: Progressing     Problem: ABCDS Injury Assessment  Goal: Absence of physical injury  Outcome: Progressing     Problem: Pain  Goal: Verbalizes/displays adequate comfort level or baseline comfort level  Outcome: Progressing

## 2022-11-10 NOTE — PROGRESS NOTES
A complete drug regimen review was completed for this patient.      [x]  No clinically significant medication issue was identified    []   Yes, a clinically significant medication issue was identified     []  Adverse Drug Event:      []  Allergy:      []  Side Effect:      []  Ineffective Therapy:      []  Drug Interaction:     []  Duplicated Therapy:     []  Untreated Indication:      []  Non-adherence:     []  Other:          Electronically signed by Murphy Alvarado RN on 11/10/22 at 3:15 PM EST

## 2022-11-10 NOTE — PROGRESS NOTES
Ethnicity  \"Are you of , /a, or Libyan origin? \"  Check all that apply:  [x] A. No, not of , /a, or [address] Origin  [] B.  Yes, [place], [place] American, Chicano/a  [] C.  Yes, [address] [place] [address]  [] D.  Yes, [place]  [] E.  Yes, another , , or Libyan origin  [] X. Patient unable to respond    Race  \"What is your race? \"  Check all that apply:  [x] A. White  [] B. Black or   [] C. American [place] [facility] or [place] Native  [] D.  [place] [facility]  [] E. [place]  [] F. Nicaraguan  [] G. [place]  [] [facility]  [] I. [place]  [] J.  Other   [] K.   [] L. Faroese or [place]ai  [] M. Lithuanian  [] N. Other [place]  [] X. Patient unable to respond    High Risk Drug Classes:  Use and Indication    Is taking: Check if the pt is taking any medications by pharmacological classification, not how it is used, in the following classes  Indication noted: If column 1 is checked, check if there is an indication noted for all meds in the drug class Is taking  (check all that apply) Indication noted (check all that apply)   Antipsychotic [] []   Anticoagulant [] []   Antibiotic [] []   Opioid [x] [x]   Antiplatelet [] []   Hypoglycemic (including insulin) [] []   None of the above []     Special Treatments, Procedures, and Programs    Check all of the following treatments, procedures, and programs that apply on admission. On admission (check all that apply)   Cancer Treatments   A1. Chemotherapy []           A2. IV []           A3. Oral []           A10. Other []   B1. Radiation []   Respiratory Therapies   C1. Oxygen Therapy []           C2. Continuous []           C3. Intermittent []           C4. High-concentration []   D1. Suctioning []           D2. Scheduled []           D3. As needed []   E1. Tracheostomy Care []   F1.  Invasive Mechanical Ventilator (ventilator or respirator) []   G1. Non-invasive Mechanical Ventilator []           G2. BiPAP [] G3. CPAP []   Other   H1. IV Medications []           H2. Vasoactive medications []           H3. Antibiotics []           H4. Anticoagulation []           H10. Other []   I1. Transfusions []   J1. Dialysis []           J2. Hemodialysis []           J3. Peritoneal dialysis []   O1. IV access []           O2. Peripheral []           O3. Midline []           O4.  Central (PICC, tunneled, port) []      None of the above (select if no Cancer, Respiratory, or Other boxes are checked) [x]

## 2022-11-11 LAB
ANION GAP SERPL CALCULATED.3IONS-SCNC: 11 MMOL/L (ref 3–16)
BUN BLDV-MCNC: 15 MG/DL (ref 7–20)
CALCIUM SERPL-MCNC: 9.1 MG/DL (ref 8.3–10.6)
CHLORIDE BLD-SCNC: 100 MMOL/L (ref 99–110)
CO2: 27 MMOL/L (ref 21–32)
CREAT SERPL-MCNC: 0.6 MG/DL (ref 0.6–1.2)
GFR SERPL CREATININE-BSD FRML MDRD: >60 ML/MIN/{1.73_M2}
GLUCOSE BLD-MCNC: 114 MG/DL (ref 70–99)
HCT VFR BLD CALC: 31.4 % (ref 36–48)
HEMOGLOBIN: 10.1 G/DL (ref 12–16)
INR BLD: 1.02 (ref 0.87–1.14)
MAGNESIUM: 1.7 MG/DL (ref 1.8–2.4)
MCH RBC QN AUTO: 29 PG (ref 26–34)
MCHC RBC AUTO-ENTMCNC: 32.3 G/DL (ref 31–36)
MCV RBC AUTO: 89.8 FL (ref 80–100)
PDW BLD-RTO: 14.4 % (ref 12.4–15.4)
PLATELET # BLD: 451 K/UL (ref 135–450)
PMV BLD AUTO: 7.9 FL (ref 5–10.5)
POTASSIUM REFLEX MAGNESIUM: 3.6 MMOL/L (ref 3.5–5.1)
PROTHROMBIN TIME: 13.3 SEC (ref 11.7–14.5)
RBC # BLD: 3.5 M/UL (ref 4–5.2)
SODIUM BLD-SCNC: 138 MMOL/L (ref 136–145)
WBC # BLD: 12.5 K/UL (ref 4–11)

## 2022-11-11 PROCEDURE — 6360000002 HC RX W HCPCS: Performed by: PHYSICAL MEDICINE & REHABILITATION

## 2022-11-11 PROCEDURE — 83735 ASSAY OF MAGNESIUM: CPT

## 2022-11-11 PROCEDURE — 97116 GAIT TRAINING THERAPY: CPT | Performed by: PHYSICAL THERAPIST

## 2022-11-11 PROCEDURE — 80048 BASIC METABOLIC PNL TOTAL CA: CPT

## 2022-11-11 PROCEDURE — 85027 COMPLETE CBC AUTOMATED: CPT

## 2022-11-11 PROCEDURE — 97110 THERAPEUTIC EXERCISES: CPT | Performed by: PHYSICAL THERAPIST

## 2022-11-11 PROCEDURE — 97530 THERAPEUTIC ACTIVITIES: CPT | Performed by: PHYSICAL THERAPIST

## 2022-11-11 PROCEDURE — 36415 COLL VENOUS BLD VENIPUNCTURE: CPT

## 2022-11-11 PROCEDURE — 97162 PT EVAL MOD COMPLEX 30 MIN: CPT | Performed by: PHYSICAL THERAPIST

## 2022-11-11 PROCEDURE — 97166 OT EVAL MOD COMPLEX 45 MIN: CPT

## 2022-11-11 PROCEDURE — 1280000000 HC REHAB R&B

## 2022-11-11 PROCEDURE — 6370000000 HC RX 637 (ALT 250 FOR IP): Performed by: PHYSICAL MEDICINE & REHABILITATION

## 2022-11-11 PROCEDURE — 85610 PROTHROMBIN TIME: CPT

## 2022-11-11 PROCEDURE — 94761 N-INVAS EAR/PLS OXIMETRY MLT: CPT

## 2022-11-11 PROCEDURE — 97535 SELF CARE MNGMENT TRAINING: CPT

## 2022-11-11 RX ORDER — LANOLIN ALCOHOL/MO/W.PET/CERES
400 CREAM (GRAM) TOPICAL 2 TIMES DAILY
Status: DISCONTINUED | OUTPATIENT
Start: 2022-11-11 | End: 2022-11-18 | Stop reason: HOSPADM

## 2022-11-11 RX ADMIN — LISINOPRIL 40 MG: 40 TABLET ORAL at 08:49

## 2022-11-11 RX ADMIN — Medication 1 TABLET: at 08:49

## 2022-11-11 RX ADMIN — POLYETHYLENE GLYCOL 3350 17 G: 17 POWDER, FOR SOLUTION ORAL at 08:50

## 2022-11-11 RX ADMIN — SENNOSIDES AND DOCUSATE SODIUM 2 TABLET: 50; 8.6 TABLET ORAL at 21:14

## 2022-11-11 RX ADMIN — Medication 1 CAPSULE: at 08:49

## 2022-11-11 RX ADMIN — ONDANSETRON HYDROCHLORIDE 4 MG: 4 TABLET, FILM COATED ORAL at 07:30

## 2022-11-11 RX ADMIN — Medication 400 MG: at 21:15

## 2022-11-11 RX ADMIN — PANTOPRAZOLE SODIUM 40 MG: 40 TABLET, DELAYED RELEASE ORAL at 05:36

## 2022-11-11 RX ADMIN — HYDROCODONE BITARTRATE AND ACETAMINOPHEN 2 TABLET: 5; 325 TABLET ORAL at 15:00

## 2022-11-11 RX ADMIN — POLYVINYL ALCOHOL 1 DROP: 14 SOLUTION/ DROPS OPHTHALMIC at 21:15

## 2022-11-11 RX ADMIN — DIAZEPAM 5 MG: 5 TABLET ORAL at 21:15

## 2022-11-11 RX ADMIN — Medication 400 MG: at 11:35

## 2022-11-11 RX ADMIN — ATORVASTATIN CALCIUM 10 MG: 10 TABLET, FILM COATED ORAL at 21:15

## 2022-11-11 RX ADMIN — HYDROCODONE BITARTRATE AND ACETAMINOPHEN 2 TABLET: 5; 325 TABLET ORAL at 09:50

## 2022-11-11 RX ADMIN — HYDROCODONE BITARTRATE AND ACETAMINOPHEN 2 TABLET: 5; 325 TABLET ORAL at 05:50

## 2022-11-11 RX ADMIN — Medication 2000 UNITS: at 08:49

## 2022-11-11 RX ADMIN — HYDROCODONE BITARTRATE AND ACETAMINOPHEN 2 TABLET: 5; 325 TABLET ORAL at 01:28

## 2022-11-11 RX ADMIN — SENNOSIDES AND DOCUSATE SODIUM 2 TABLET: 50; 8.6 TABLET ORAL at 08:50

## 2022-11-11 RX ADMIN — ENOXAPARIN SODIUM 40 MG: 100 INJECTION SUBCUTANEOUS at 21:14

## 2022-11-11 RX ADMIN — METOPROLOL SUCCINATE 25 MG: 25 TABLET, EXTENDED RELEASE ORAL at 08:49

## 2022-11-11 RX ADMIN — HYDROCODONE BITARTRATE AND ACETAMINOPHEN 2 TABLET: 5; 325 TABLET ORAL at 19:01

## 2022-11-11 ASSESSMENT — PAIN DESCRIPTION - LOCATION
LOCATION: LEG

## 2022-11-11 ASSESSMENT — PAIN SCALES - GENERAL
PAINLEVEL_OUTOF10: 7
PAINLEVEL_OUTOF10: 8
PAINLEVEL_OUTOF10: 8
PAINLEVEL_OUTOF10: 7
PAINLEVEL_OUTOF10: 0
PAINLEVEL_OUTOF10: 5
PAINLEVEL_OUTOF10: 8
PAINLEVEL_OUTOF10: 5

## 2022-11-11 ASSESSMENT — PAIN DESCRIPTION - PAIN TYPE
TYPE: ACUTE PAIN

## 2022-11-11 ASSESSMENT — PAIN DESCRIPTION - ONSET
ONSET: ON-GOING

## 2022-11-11 ASSESSMENT — PAIN - FUNCTIONAL ASSESSMENT
PAIN_FUNCTIONAL_ASSESSMENT: ACTIVITIES ARE NOT PREVENTED

## 2022-11-11 ASSESSMENT — PAIN DESCRIPTION - DESCRIPTORS
DESCRIPTORS: ACHING

## 2022-11-11 ASSESSMENT — PAIN DESCRIPTION - ORIENTATION
ORIENTATION: LEFT

## 2022-11-11 ASSESSMENT — PAIN DESCRIPTION - FREQUENCY
FREQUENCY: CONTINUOUS

## 2022-11-11 NOTE — PLAN OF CARE
Problem: Discharge Planning  Goal: Discharge to home or other facility with appropriate resources  11/11/2022 1106 by Katerine Littlejohn RN  Outcome: Progressing  Flowsheets (Taken 11/11/2022 0845)  Discharge to home or other facility with appropriate resources:   Identify barriers to discharge with patient and caregiver   Arrange for needed discharge resources and transportation as appropriate   Identify discharge learning needs (meds, wound care, etc)   Refer to discharge planning if patient needs post-hospital services based on physician order or complex needs related to functional status, cognitive ability or social support system     Problem: Safety - Adult  Goal: Free from fall injury  11/11/2022 1106 by Katerine Littlejohn RN  Outcome: Progressing  Flowsheets (Taken 11/11/2022 1104)  Free From Fall Injury: Instruct family/caregiver on patient safety     Problem: ABCDS Injury Assessment  Goal: Absence of physical injury  11/11/2022 1106 by Katerine Littlejohn RN  Outcome: Progressing  Flowsheets (Taken 11/11/2022 1104)  Absence of Physical Injury: Implement safety measures based on patient assessment     Problem: Pain  Goal: Verbalizes/displays adequate comfort level or baseline comfort level  11/11/2022 1106 by Katerine Littlejohn RN  Outcome: Progressing  Flowsheets  Taken 11/11/2022 0950  Verbalizes/displays adequate comfort level or baseline comfort level:   Encourage patient to monitor pain and request assistance   Assess pain using appropriate pain scale   Administer analgesics based on type and severity of pain and evaluate response   Implement non-pharmacological measures as appropriate and evaluate response

## 2022-11-11 NOTE — PROGRESS NOTES
Patient admitted to rehab with fusion of spine. A/Ox4. Transfers with walker x1. Mobility restrictions: LLE weakness. On regular diet, tolerating well. Medications taken whole with thins. On Lovenox for DVT prophylaxis. Skin: surgical incisions to back and abdomen. Oxygen: RA. LDA: none. Has been continent of bowel and continent of bladder. LBM 11/8/22. Chair/bed alarms in use and call light in reach. Will monitor for safety.  Electronically signed by Dale Menjivar RN on 11/11/2022 at 11:09 AM

## 2022-11-11 NOTE — CARE COORDINATION
Chart Reviewed. Met with patient and spouse to introduce  role, initiate discussion regarding DC planning, to inform of weekly Team Conferences and to complete ACP. ACP Completed ; see separate note. SOCIAL WORK ASSESSMENT      GOAL:     To return home with  spouse      HOME SITUATION:  Pt and spouse live in a house with one step entry. First Floor set up with laundry in the basement. Spouse is in good health, chops down trees for their heating system. Patient has not been able to do much due to medicall issues. Their goal is to return home. Pcp:    Dr Javier Valladares    Pharamcy:   Enriqueta        PRIOR LEVEL OF FUNCTIONING:       PERSONAL CARE:    indpendent                                                                       DRIVES:yes                                                                     Abdi Rape:   shared                              GROCERY SHOPS:  spouse      DME CURRENTLY AT HOME:  shower stool, wh walker, 3 piece hip kit. CURRENT HOME CARE/SERVICES:none currently. Informed them of possible post acute services such as home care or out patient services to continue her progress. Provided a CMS star rated list of agencies of home care. PREFERRED HOME CARE:  TBD      ADVANCED DIRECTIVES:  Completed ACP      TEAM CONFERENCE DAY:  Tuesdays. Informed them of weekly Team conferences where Team will review progress, goals, DME and DC date. This worker will return to give this update and plan for dc needs. LSW informed patient of preferred  time on date of discharge which is between 10 - 12 noon. LSW informed patient of recommendation for PCP visit within 7 days post discharge. Transportation:   Pt reports she has not missed any appts due to transportation needs.     Evelio Cortes, [place]     Case Management   [phone]    11/11/2022  2:32 PM

## 2022-11-11 NOTE — PROGRESS NOTES
Comprehensive Nutrition Assessment    Type and Reason for Visit:  Initial, Consult    Nutrition Recommendations/Plan:   Continue on regular diet  Monitor N/V  See again for ONS need     Malnutrition Assessment:  Malnutrition Status: At risk for malnutrition (Comment) (11/11/22 0472)    Context:  Acute Illness       Nutrition Assessment:    Pt admitted to ARU s/p fusion of lumbar region. PMH includes HLD and HTN. Pt states she normally eats well. Lunch tray was in room untouched as pt has had N/V today. This has been occuring since her surgery, medications to improve this have been changed with limited some success, but not all the time. Limited conversation considering this. Nutrition Related Findings:    BM 11/8, no edema, Mag 1.7 Wound Type: Surgical Incision       Current Nutrition Intake & Therapies:    Average Meal Intake: %, 0%  Average Supplements Intake: None Ordered  ADULT DIET; Regular    Anthropometric Measures:  Height: 5' 1\" (154.9 cm)  Ideal Body Weight (IBW): 105 lbs (48 kg)    Admission Body Weight: 139 lb 15.9 oz (63.5 kg)  Current Body Weight: 143 lb 8.3 oz (65.1 kg), 136.7 % IBW. Weight Source: Bed Scale  Current BMI (kg/m2): 27.1    Weight Adjustment For: No Adjustment  BMI Categories: Overweight (BMI 25.0-29. 9)    Estimated Daily Nutrient Needs:  Energy (kcal/day): 5152-5460 (20-25 x CBW 65)  Protein (g/day): 62-72 ( 1.3-1.5 x IBW 48)  Fluid (ml/day): per provider    Nutrition Diagnosis:   Predicted inadequate energy intake related to altered GI function as evidenced by nausea, vomiting    Nutrition Interventions:   Food and/or Nutrient Delivery: Continue Current Diet  Nutrition Education/Counseling: No recommendation at this time  Coordination of Nutrition Care: Continue to monitor while inpatient       Goals:     Goals: PO intake 50% or greater       Nutrition Monitoring and Evaluation:   Behavioral-Environmental Outcomes: None Identified  Food/Nutrient Intake Outcomes: Food and Nutrient Intake  Physical Signs/Symptoms Outcomes: Biochemical Data, Nausea or Vomiting, Fluid Status or Edema, Nutrition Focused Physical Findings, Skin, Weight    Discharge Planning:     Too soon to determine     Holly Conley, [address] [place] [address], LD  Contact: [phone]

## 2022-11-11 NOTE — PLAN OF CARE
ARU PATIENT TREATMENT PLAN  [facility]   [place] [id number]  [phone]    John Meraz    : 1950  Acct #: [de-identified]  MRN: [id number]   PHYSICIAN:  Spurgeon Snellen, MD  Primary Problem    Patient Active Problem List   Diagnosis    Spinal stenosis of lumbar region without neurogenic claudication    Fusion of spine of lumbar region       Rehabilitation Diagnosis:     Fusion of spine of lumbar region [M43.26]       ADMIT DATE:11/10/2022    Patient Goals: go home and take care of herself    Admitting Impairments: L4-S1 anterior lumbar interbody fusion with pedicle screw fixation and laminectomy and decompression procedure. , 11/3, Dr. Milana Pulido     HTN-lisinopril, Toprol     HLD-Lipitor     Barriers: pain, weakness, comorbidities  Participation: good     CARE PLAN     NURSING:  John Meraz while on this unit will:     [] Be continent of bowel and bladder     [x] Have an adequate number of bowel movements  [x] Urinate with no urinary retention >300ml in bladder  [] Complete bladder protocol with messer removal  [x] Maintain O2 SATs at _92%  [x] Have pain managed while on ARU       [] Be pain free by discharge   [x] Have no skin breakdown while on ARU  [] Have improved skin integrity via wound measurements  [] Have no signs/symptoms of infection at the wound site  [x] Be free from injury during hospitalization   [x] Complete education with patient/family with understanding demonstrated for:fusion of spine of lumbar region  [x] Adjustment   [x] Other:   Nursing interventions may include bowel/bladder training, education for medical assistive devices, medication education, O2 saturation management, energy conservation, stress management techniques, fall prevention, alarms protocol, seating and positioning, skin/wound care, pressure relief instruction,dressing changes,  infection protection, DVT prophylaxis, and/or assistance with in room safety with transfers to bed, toilet, wheelchair, shower as well as bathroom activities and hygiene. Patient/caregiver education for:   [x] Disease/sustained injury/management      [x] Medication Use   [x] Surgical intervention   [x] Safety   [x] Body mechanics and or joint protection   [x] Health maintenance         PHYSICAL THERAPY:  Goals:                  Short Term Goals  Time Frame for Short Term Goals: 7-10 days  Short Term Goal 1: transfer with MI  Short Term Goal 2: Bed mobility with MI  Short Term Goal 3: ambulate with wheeled walker 150' with MI  Short Term Goal 4: ascend/descend curb step with wheeled walker with SBA            Long Term Goals  Time Frame for Long Term Goals : STG= LTG  These goals were reviewed with this patient at the time of assessment and Pleas Epley is in agreement. Plan of Care: Pt to be seen 90   mins per day for 5-6 day/week 10 days. Current Treatment Recommendations: Strengthening, Balance training, Functional mobility training, Transfer training, ADL/Self-care training, Endurance training, Gait training, Stair training, Pain management, Home exercise program, Safety education & training, Patient/Caregiver education & training, Equipment evaluation, education, & procurement, Positioning, Therapeutic activities      OCCUPATIONAL THERAPY:  Goals:             Short Term Goals  Time Frame for Short Term Goals: in 7-10 days pt will be. ..   Short Term Goal 1: indep with toileting using grab bars PRN  Short Term Goal 2: indep with bathing using AE to maintain spinal precautions  Short Term Goal 3: indep with UB dressing  Short Term Goal 4: indep with LB dressing (exception of CHANA hose) using AE to maintain spinal precautions  Short Term Goal 5: indep with functional mobility and transfers using LRAD while maintaining spinal precautions  Additional Goals?: Yes  Short Term Goal 6: indep with IADL tasks using LRAD and maintaining spinal precautions :    :    These goals were reviewed with this patient at the time of assessment and Pasquale Cuevas is in agreement    Plan of Care:  Pt to be seen 90   mins per day for 5-6 day/week 7-10 days. SPEECH THERAPY: Goals will be left blank if speech is not following this patient. Goals: These goals were reviewed with this patient at the time of assessment and Pasquale Cuevas is in agreement    Plan of Care: Pt to be seen    mins per day for  day/week  weeks. CASE MANAGEMENT:  Goals:   Assist patient/family with discharge planning, patient/family counseling,   and coordination with insurance during ARU stay. Admission Period/Goal QIM CODES   QIM  Admit/Goal Score    Eating     Oral Hygiene     Toileting Hygiene     Shower/Bathe Self     Upper Body Dressing     Lower Body Dressing     Putting on/Taking off Footwear     Roll Left and Right     Sit to Lying     Lying to Sitting on Side of Bed     Sit to Stand     Chair/Bed to Chair Transfer     Toilet Transfer     Car Transfer     Walk 10 feet     Walk 50 feet with 2 Turns     Walk 150 feet with 2 turns     Walk 10 feet on Uneven Surfaces     1 Step     4 Steps     12 Steps     Picking up Object     Wheel 50 feet with 2 Turns   Type? Wheel 150 feet with 2 Turns   Type? Pasquale Cuevas will be seen a minimum of 3 hours of therapy per day, a minimum of 5 out of 7 days per week. [] In this rare instance due to the nature of this patient's medical involvement, this patient will be seen 15 hours per week (900 minutes within a 7 day period). Treatments may include therapeutic exercises, gait training, neuromuscular re-ed, transfer training, community reintegration, bed mobility, self care, home mgmt, cognitive training, energy conservation,dysphagia tx, speech/language/communication therapy, group therapy, and patient/family education.  In addition, dietician/nutritionist may monitor calorie count as well as intake and collaboratively work with SLP on dietary upgrades. Neuropsychology/Psychology may evaluate and provide necessary support. Medical issues being managed closely and that require 24 hour availability of a physician:   [] Swallowing Precautions  [] Bowel/Bladder Fx  [] Weight bearing precautions   [x] Wound Care    [x] Pain Mgmt   [x] Infection Protection   [x] DVT Prophylaxis   [x] Fall Precautions  [x] Fluid/Electrolyte/Nutrition Balance   [] Voice Protection   [] Respiratory  [] Other:  []  Medical Prognosis: [] Good   Fair    [] Guarded   Total expected IRF days 14 days  Anticipated discharge destination:    [] Home Independently   [x] Home Modified Independent  [] Home with supervision    []SNF     [] Other                                           Physician anticipated functional outcomes:  Fausto for functional mobility and ADLs     IPOC brief synthesis: Patient is a 70-year-old female with a history of severe lumbar spinal stenosis causing low back and leg pain complaints that does not improve with conservative treatment. Patient was taken to surgery on 11/3 by Dr. Diony Terry at [facility] where she underwent L4-S1 anterior lumbar interbody fusion with pedicle screw fixation and laminectomy and decompression procedure. Postoperatively patient was started therapies, but needs more therapy before discharge to home safely. Patient also has history of hypertension and hyperlipidemia. Patient lives at home with her  in a 1 - level house.         I have reviewed this initial plan of care and agree with its contents:    Title   Name    Date    Time    Physician: Dr. Alvin Lam, 11/11/22, 3 pm    Case Mgmt:Anne Facundo [facility]     Case Management   [phone]    11/11/2022  2:26 PM      OT: Dany Gruber S/OT, Shahla Res, OTR/L  #[id number] 11/11/22 12:08 PM     PT:Electronically signed by Roxanna Valle PT [id number] on 11/11/2022 at 12:34 PM     ST:    ARU Supervisor: Slick Henderson RN CRRN    Other:

## 2022-11-11 NOTE — PROGRESS NOTES
Physical Therapy  Facility/Department: [address] REHAB  Rehabilitation Physical Therapy Initial Assessment  PM session    NAME: Srinath Rodriguez  : 1950 (67 y.o.)  MRN: [id number]  CODE STATUS: Full Code    Date of Service: 22      Past Medical History:   Diagnosis Date    Hypertension     PONV (postoperative nausea and vomiting)      Past Surgical History:   Procedure Laterality Date    CYST REMOVAL      TONSILLECTOMY      TUBAL LIGATION         Additional Pertinent Hx: Per Dr. Niya Khanna H&P, \"Patient is a 77-year-old female with a history of severe lumbar spinal stenosis causing low back and leg pain complaints that does not improve with conservative treatment. Patient was taken to surgery on 11/3 by Dr. Rahat De Los Santos at [facility] where she underwent L4-S1 anterior lumbar interbody fusion with pedicle screw fixation and laminectomy and decompression procedure. Postoperatively patient was started therapies, but needs more therapy before discharge to home safely. Patient also has history of hypertension and hyperlipidemia. Patient lives at home with her  in a 1 - level house. \"  Family / Caregiver Present: No  Referring Practitioner: Dr. Niya Khanna  Referral Date : 11/10/22  Diagnosis: fusion of spine lumbar region    Restrictions:  Restrictions/Precautions: General Precautions; Fall Risk  Position Activity Restriction  Spinal Precautions: No Bending; No Lifting; No Twisting     SUBJECTIVE  Subjective: Patient with no nausea at present, c/o L leg pain L lateral thigh occasionally but none at present. Patient agreeable to eval and treat. patient c/o of nausea with movement.        Prior Level of Function:  Social/Functional History  Lives With: Spouse (, \"Reagan\")  Type of Home: House  Home Layout: One level  Home Access: Stairs to enter without rails  Entrance Stairs - Number of Steps: 1 MIRIAN to enter through doorway  Bathroom Shower/Tub: Tub/Shower unit, Shower chair without back  [employer]: (comfort height)  Bathroom Accessibility: Walker accessible  Home Equipment: Walker, rolling, Reacher, Sock aid, Long-handled shoehorn  Receives Help From: Family  ADL Assistance: Independent  Homemaking Assistance: Independent  Homemaking Responsibilities: Yes  Meal Prep Responsibility: Secondary  Cleaning Responsibility: Secondary  Bill Paying/Finance Responsibility: Primary  Shopping Responsibility: Secondary  Ambulation Assistance: Independent  Transfer Assistance: Independent  Active : Yes  Occupation: Retired  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 , hairdresser, house cleaning for the elderly, 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  Leisure & Hobbies: everyday stuff, flowers, birds      OBJECTIVE  Vision  Vision: Within Functional Limits (appears WFL during eval)    Hearing  Hearing: Within functional limits (appears WFL during eval)    Cognition  Overall Cognitive Status: Exceptions  Following Commands:  Follows all commands without difficulty  Attention Span: Appears intact  Memory: Appears intact  Safety Judgement: Decreased awareness of need for safety (required frequent reminders of her spinal precautions)  Cognition Comment: cues for wheeled walker safety    ROM  AROM RLE (degrees)  RLE AROM: WFL  AROM LLE (degrees)  LLE AROM : Exceptions  LLE General AROM: AROM L hip 70 degrees in supine with assist, sitting , needs assist due to pain past 90 degrees, hip abduction x 15 degrees with assist, knee and ankle [facility]    Strength  Strength RLE  Strength RLE: WFL  Strength LLE  Strength LLE: Exception  Comment: hip 3-/5 flexion and abduction, knee extension 4-/5. knee flexion 4/5, DF 4/5    Quality of Movement  Tone RLE  RLE Tone: Normotonic  Tone LLE  LLE Tone: Normotonic    Sensation  Overall Sensation Status: WFL    Functional Mobility  Bed mobility  Bridging: Unable to assess (due to pain)  Rolling to Left: Unable to assess (only slight roll due to pain)  Rolling to Right: Minimal assistance;Contact guard assistance (with rail)  Supine to Sit: Minimal assistance (, min assist per nursing- assist with L LE with rail)  Sit to Supine: Moderate assistance (assist with B LEs with rail)  Scooting: Moderate assistance  Bed Mobility Comments: Patient in hospital bed with rail with slight HOB raised  Transfers  Sit to Stand: Contact guard assistance (cues for hand placement)  Stand to Sit: Contact guard assistance (cues for hand placement)  Bed to Chair: Contact guard assistance (cues for safety with wheeled walker)  Car Transfer: Minimal Assistance  Comment: Patient needs assist with L LE to enter and exit car and cues for hand placment  Balance  Posture: Fair  Sitting - Static: Good  Sitting - Dynamic: Fair  Standing - Static: Good;-  Standing - Dynamic: Fair  Comments: CGA with wheeled walker    Environmental Mobility  Ambulation  Surface: Level tile  Device: Rolling Walker  Assistance: Contact guard assistance  Quality of Gait: Patient with decreased step length and height B LE , forward posture , tended to push wheeled walker away- cues to hkeep close  Gait Deviations: Slow Mirella;Decreased step length;Decreased step height;Shuffles  Distance: 61' with several turns( 126' in PM)  Comments: patient c/o L leg pain with ambulation  Stairs/Curb  Stairs?: Yes  Stairs  # Steps : 1  Curbs: 6\"  Device: Rolling walker  Assistance: Minimal assistance  Comment: Patient required assist with wheeled walker , leads with R LE, descend with L LE    PT Exercises  A/AROM Exercises: AP x 20, GS x 10, hip and knee flex /ext x 2 with assist L LE    ASSESSMENT       Activity Tolerance  Activity Tolerance: Patient limited by pain; Patient limited by fatigue;Treatment limited secondary to medical complications  Activity Tolerance Comments: nausea    Assessment  Assessment: Patient is a 68 y/o female who had surgery on 11/3 by Dr. Linda Barrow at [facility] where she underwent L4-S1 anterior lumbar interbody fusion with pedicle screw fixation and laminectomy and decompression procedure. Patient transfer to ARU on 11/10. Patient was independent with no device and lives with spouse. Patient has 1 step to enter home. Patient required CGA for transfers with cus for wheeled walker safety and able to ambulate with wheeled walker houshold distances with firm CGA, guarded and slow move ments. patient required moderate assist for bed mobility. patient limited due to pain , weakness primary L LE and nausea. Patient would benefit from ARU to improve overall strength, balance, improve pain management  in order to achieve functional mobility to independence to return home. Treatment Diagnosis: decreased functional mobility  Therapy Prognosis: Good  Decision Making: Medium Complexity  History: hypertension and hyperlipidemia. Exam: as above  Clinical Presentation: evolving  Barriers to Learning: pain and nausea  Treatment Initiated : 11/11  Discharge Recommendations: Continue to assess pending progress; Patient would benefit from continued therapy after discharge  PT D/C Equipment  Other: has wheeled walker  PT Equipment Recommendations  Other: has wheeled walker    CLINICAL IMPRESSION   Patient is a 68 y/o female who had surgery on 11/3 by Dr. Nay Palma at [facility] where she underwent L4-S1 anterior lumbar interbody fusion with pedicle screw fixation and laminectomy and decompression procedure. Patient transfer to ARU on 11/10. Patient was independent with no device and lives with spouse. Patient has 1 step to enter home. Patient required CGA for transfers with cus for wheeled walker safety and able to ambulate with wheeled walker houshold distances with firm CGA, guarded and slow move ments. patient required moderate assist for bed mobility. patient limited due to pain , weakness primary L LE and nausea.  Patient would benefit from ARU to improve overall strength, balance, improve pain management  in order to achieve functional mobility to independence to return home. GOALS  Patient Goals   Patient Goals : Patient's goal is to go home and take care of herself  Short Term Goals  Time Frame for Short Term Goals: 7-10 days  Short Term Goal 1: transfer with MI  Short Term Goal 2: Bed mobility with MI  Short Term Goal 3: ambulate with wheeled walker 150' with MI  Short Term Goal 4: ascend/descend curb step with wheeled walker with SBA  Long Term Goals  Time Frame for Long Term Goals : STG= LTG    PLAN OF CARE  Frequency: 1-2 treatment sessions per day, 5-7 days per week  Physcial Therapy Plan  General Plan:  minutes of therapy at least 5 out of 7 days a week  Days Per Week: 5 Days  Hours Per Day: 1.5 hours  Therapy Duration: 10 Days (7-10 days)  Specific Instructions for Next Treatment: curb step assess  Current Treatment Recommendations: Strengthening;Balance training;Functional mobility training;Transfer training;ADL/Self-care training; Endurance training;Gait training;Stair training;Pain management;Home exercise program;Safety education & training;Patient/Caregiver education & training;Equipment evaluation, education, & procurement;Positioning; Therapeutic activities  Safety Devices  Type of Devices: Call light within reach; Patient at risk for falls; Left in bed; All fall risk precautions in place;Gait belt;Bed alarm in place  Restraints  Restraints Initially in Place: No    EDUCATION  Education  Education Given To: Patient  Education Provided: Role of Therapy;Plan of Care;Precautions; Safety;ADL Function;Mobility Training;Transfer Training;Home Exercise Program;Family Education;Equipment  Education Method: Demonstration;Verbal;Teach Back  Barriers to Learning: None  Education Outcome: Verbalized understanding;Continued education needed;Demonstrated understanding  Second Session  S/ Patient reports feeling better with less nausea, pain L lateral thigh with mobility. Patient agreeable to therapy.   O/ Patient to PT via w/c-Patient performed sitting exercises- GS, adductor sats x 15, marches x 10 with assist L LE, LAQ x 10, AP x 20. Patient ambulated as bolded above, performed curb step and car transfers as bolded above. Patient sit to stand CGA, ambulated 120' with wheeled walker with CGA. Patient in w/c with BLE slightly elevated on stool. Patient with call light, chair alarm present  Second assessment - patient improved with mobility then AM especially in regards to nausea. Patient very guarded with all movements.   Therapy Time   Individual Concurrent Group Co-treatment   Time In 8971         Time Out 1200         Minutes 45           Timed Code Treatment Minutes: 30 Minutes   Second Session Therapy Time     Individual Co-treatment   Time In 6943     Time Out 1600     Minutes 39           KWKAU POP, PT, 11/11/22 at 4:04 PM

## 2022-11-11 NOTE — PROGRESS NOTES
Patient admitted to rehab with laminectomy/lumbar fusion of spine. A/Ox4. Transfers with walker x 1. Mobility restrictions: WBAT. On Regular diet, tolerating well. Medications taken whole with thin liquids. On Lovenox for DVT prophylaxis. Skin: Surgical incision x 2 to back, dermabond; incision to abdomen, dermabond. Oxygen: RA. LDA: None. Has been Continent of bowel and bladder. LBM 11/8. Chair/bed alarms in use and call light in reach. Will monitor for safety.

## 2022-11-11 NOTE — PLAN OF CARE
Problem: Safety - Adult  Goal: Free from fall injury  11/11/2022 0313 by Anthony Carreno RN  Outcome: Progressing  Note: Fall risk band on patient. Orange light on outside of room. Non skid footwear in place. Alarms used appropriately. Patient instructed to call and wait for staff before getting up. Rounding done to anticipate needs. Appropriate safety devices used for transfers.

## 2022-11-11 NOTE — PROGRESS NOTES
Occupational Therapy  Facility/Department: [facility]  Rehabilitation Occupational Therapy Evaluation       Date: 22  Patient Name: Patel Thomas       Room: E6U-7061/0399-35  MRN: [id number]  Account: [de-identified]   : 1950  (73 y.o.) Gender: female     Referring Practitioner: Kahlil Dickerson  Diagnosis: spinal lumbar fusion  Additional Pertinent Hx: Patient is a 80-year-old female with a history of severe lumbar spinal stenosis causing low back and leg pain complaints that does not improve with conservative treatment. Patient was taken to surgery on 11/3 by Dr. Rambo Bills at [facility] where she underwent L4-S1 anterior lumbar interbody fusion with pedicle screw fixation and laminectomy and decompression procedure. Postoperatively patient was started therapies, but needs more therapy before discharge to home safely. Patient also has history of hypertension and hyperlipidemia. Patient lives at home with her family in a 1 - level house. (Copied Dr Esther Barrow note 11/10/22)    Restrictions  Restrictions/Precautions: General Precautions; Fall Risk  Other position/activity restrictions: 2 incisions on lower back, 1 incision on lower stomach    Subjective  Subjective: pt met bedside in room, nursing present to administer pain medication, pt states she is feeling \"yucky\" but agreeable to OT this AM      Social/Functional History  Lives With: Spouse (, \"Reagan\")  Type of Home: House  Home Layout: One level  Home Access: Stairs to enter without rails  Entrance Stairs - Number of Steps: 1 MIRIAN to enter through doorway  Bathroom Shower/Tub: Tub/Shower unit; Shower chair without back  [employer]:  (comfort height)  Bathroom Accessibility: Walker accessible  Home Equipment: Walker, rolling;Reacher;Sock aid;Long-handled shoehorn  Has the patient had two or more falls in the past year or any fall with injury in the past year?: No  Receives Help From: Family  ADL Assistance: [address] Assistance: Independent  Homemaking Responsibilities: Yes  Meal Prep Responsibility: Secondary  Cleaning Responsibility: Secondary  Bill Paying/Finance Responsibility: Primary  Shopping Responsibility: Secondary  Ambulation Assistance: Independent  Transfer Assistance: Independent  Active : Yes  Occupation: Retired        Objective     Cognition  Overall Cognitive Status: Exceptions  Following Commands:  Follows all commands without difficulty  Attention Span: Appears intact  Memory: Appears intact  Safety Judgement: Decreased awareness of need for safety (required frequent reminders of her spinal precautions)  Cognition Comment: pt required frequent cues while amb with RW to maintain spinal precautions  Orientation  Overall Orientation Status: Within Normal Limits  Orientation Level: Oriented X4   Perception  Overall Perceptual Status: WFL  Sensation  Overall Sensation Status: WFL   ROM  LUE AROM (degrees)  LUE AROM : WFL  Left Hand AROM (degrees)  Left Hand AROM: WFL  RUE AROM (degrees)  RUE AROM : WFL  Right Hand AROM (degrees)  Right Hand AROM: WFL  LUE Strength  Gross LUE Strength: WFL  L Hand General: 5/5  LUE Strength Comment: shoulder NT d/t lifting restrictions, elbow flex/ext grossly 4+/5  RUE Strength  Gross RUE Strength: WFL  R Hand General: 5/5 (pt c/o decreased strength in R hand while grasping objects, however, no significant difference in  strength noted while perfoming manual testing)  RUE Strength Comment: shoulder NT d/t lifting restrictions, elbow flex/ext grossly 4+/5  Fine Motor Skills  Coordination  Movements Are Fluid And Coordinated: Yes        Functional Mobility  Balance  Sitting Balance: Stand by assistance  Standing Balance: Contact guard assistance  Standing Balance  Time: 1 min  Activity: standing at grab bar for LB dressing  Comment: no observed LOB, CGA for safety concerns with LLE pain and muscle spasms    Functional Mobility     Functional - Mobility Device Rolling Walker Activity To/from bathroom   Assist Level Contact guard assistance   Functional Mobility Comments CGA for safety concerns with LLE pain/muscle spasms     Transfers  Sit to stand: Contact guard assistance  Stand to sit: Contact guard assistance  Transfer Comments: to/from recliner, shower chair with back, wheelchair, using RW to complete all transfers, frequent verbal cues for hand placement during transfers  Toilet Transfers  Toilet - Technique: Ambulating  Equipment Used: Standard toilet  Toilet Transfer: Contact guard assistance  Shower Transfers  Shower - Transfer From: Colten Zaman (RW)  Shower - Transfer Type: To and From  Shower - Transfer To: Shower seat with back  Shower - Technique: Ambulating  Shower Transfers: Contact Guard;Verbal cues  Shower Transfers Comments: verbal cues for hand placement    ADL  Grooming: Setup  Grooming Skilled Clinical Factors: pt completed hair combing and oral hygiene while seated at sink in wheelchair  UE Bathing: Minimal assistance  UE Bathing Skilled Clinical Factors: pt able to wash UEs, chest, abdomen while seated in shower chair, required assist to rinse soap off with [address]  LE Bathing: Moderate assistance  LE Bathing Skilled Clinical Factors: pt able to wash tops of thighs while seated in shower chair, able to wash buttocks/periarea in stance with grab bars and CGA (pt flexing her knees to reach behind while maintaining spinal precautions), assist to wash and dry below her knees and her feet  UE Dressing:  Moderate assistance  UE Dressing Skilled Clinical Factors: pt able to thread BUE through bra straps, required assist to clasp behind her, able to thread BUE through shirt, required assist to pull L sleeve above shoulder, able to pull shirt overhead, required assist to pull down back side of shirt  LE Dressing: Dependent/Total  LE Dressing Skilled Clinical Factors: pt required assist to thread BLE through underpants/pants, assist to manage over hips, pt able to lift R foot but required assist to lift her L foot. Pt changed pants after grooming, changing into a pair with a larger elastic waistband to ensure safety of incisions when removing pants to use restroom, pt required the same amount of assist to don second pair of pants  Toileting: Minimal assistance  Toileting Skilled Clinical Factors: toilet transfer from  with CGA, verbal cues for hand placement, assist to manage hospital gown, pt voided urine and completing toilet hygiene herself  Additional Comments: Covered incisions on back with PamelaaGuacorrie, noted stomach incision after pt already in shower so did not cover but would recommend covering in future sessions. Pt completed bathing and dressing while seated in shower chair for majority of time. Pt c/o increased nausea while completing LB dressing, vomiting x2 into basin while seated in shower chair, dry heaving x2 after dressed, educated on pursed lip breathing, reported she does not have stomach pain but becomes nauseated frequently. Nursing is aware and monitoring. Goals  Patient Goals   Patient goals : pt stated she wants to start feeling better  Short Term Goals  Time Frame for Short Term Goals: in 7-10 days pt will be. .. Short Term Goal 1: indep with toileting using grab bars PRN  Short Term Goal 2: indep with bathing using AE to maintain spinal precautions  Short Term Goal 3: indep with UB dressing  Short Term Goal 4: indep with LB dressing (exception of CHANA hose) using AE to maintain spinal precautions  Short Term Goal 5: indep with functional mobility and transfers using LRAD while maintaining spinal precautions  Additional Goals?: Yes  Short Term Goal 6: indep with IADL tasks using LRAD and maintaining spinal precautions    Assessment  Performance deficits / Impairments: Decreased functional mobility ; Decreased ADL status; Decreased strength;Decreased safe awareness;Decreased endurance;Decreased balance;Decreased high-level IADLs  Assessment: Patient is a 77-year-old female with a history of severe lumbar spinal stenosis causing low back and leg pain complaints that does not improve with conservative treatment. Patient was taken to surgery where she underwent L4-S1 anterior lumbar interbody fusion with pedicle screw fixation and laminectomy and decompression procedure. PTA, pt was living in a one level home with her , Jaime Owen. Pt was independent in all ADL tasks, and shared homemaking activities with her . Pt assisted her  in completing cooking, cleaning, and shopping, and she primarly was paying the bills. Her  was completing the laundry and medication management. Pt owns a shower chair without a back, RW, reacher, long sponge, and sock aid. Today pt was seen by OT for and eval and ADL. Pt was able to complete functional mobility and transfers with a RW and CGA, frequent cueing for maintaining spinal precautions and safe use of RW. Pt was able to complete UB bathing with min assist, LB bathing with mod assist, UB dressing with min assist and required total assist for LB dressing. Pt c/o increased nausea while completing ADL tasks, vomiting and dry heaving throughout end of session. Pt is currently functioning below baseline and is limited by her activity tolerance, nausea, weakness, LE pain. Pt requires frequent reminders of her spinal precautions. Anticipate pt will require 7-10 days skilled IPR services to decrease caregiver burden prior to discharge. Anticipate pt will discharge home with assist from her  and a friend PRN, home OT. Anticipate pt will require a shower seat with back and long shoehorn at this time, will cont to assess for DME needs. Treatment Diagnosis: generalized weakness  Prognosis: Good  Decision Making: Medium Complexity  History: Patient is a 77-year-old female with a history of severe lumbar spinal stenosis causing low back and leg pain complaints that does not improve with conservative treatment.  Patient was taken to surgery on 11/3 by Dr. Jaxon Liang at [facility] where she underwent L4-S1 anterior lumbar interbody fusion with pedicle screw fixation and laminectomy and decompression procedure. Postoperatively patient was started therapies, but needs more therapy before discharge to home safely. Patient also has history of hypertension and hyperlipidemia. Patient lives at home with her family in a 1 - level house. (Copied Dr Maria E Carlton note 11/10/22)  Exam: functional mobility, transfers, ADL performance  Discharge Recommendations: Continue to assess pending progress;Home with assist PRN;Patient would benefit from continued therapy after discharge;Home with Home health OT  Occupational Therapy Plan  Times Per Week: 5-6  Times Per Day: Twice a day  Days Per Week: 5 Days  Hours Per Day: 1.5 hours  Therapy Duration: 10 Days (7-10 Days)  Current Treatment Recommendations: Strengthening;Balance training;Functional mobility training; Endurance training;Pain management; Safety education & training;Patient/Caregiver education & training;Equipment evaluation, education, & procurement;Self-Care / ADL; Home management training       Therapy Time   Individual Concurrent Group Co-treatment   Time In 0945         Time Out 1115         Minutes 90         Timed Code Treatment Minutes: 75 Minutes (+15 min eval)       Chong Her, S/OT    Therapist was present, directed the patient's care, made skilled judgement, and was responsible for assessment and treatment of the patient.        ANH Valero/L  #[id number] 11/11/22 4:35 PM

## 2022-11-11 NOTE — H&P
[facility]  Dr. Ozzy Orlando History & Physical      Patient Identification:  Catalina Chakraborty  : 1950  Admit date: 11/10/2022  Dictation date: 22   Attending provider: Ondina Ibrahim MD        Primary care provider: [facility]       Chief Complaint:   Patient Active Problem List   Diagnosis    Spinal stenosis of lumbar region without neurogenic claudication    Fusion of spine of lumbar region       History of Present Illness/Hospital Course:  Patient is a 75-year-old female with a history of severe lumbar spinal stenosis causing low back and leg pain complaints that does not improve with conservative treatment. Patient was taken to surgery on 11/3 by Dr. Zakiya Guevara at [facility] where she underwent L4-S1 anterior lumbar interbody fusion with pedicle screw fixation and laminectomy and decompression procedure. Postoperatively patient was started therapies, but needs more therapy before discharge to home safely. Patient also has history of hypertension and hyperlipidemia. Patient lives at home with her  in a 1 - level house. Prior Level of Function:  Independent in self care and ADLs prior to admission. Current Level of Function:  PT:  Patient needs min to mod assist for transfers and gait with rolling walker    OT:  Patient needs min to mod assist for ADLs and self-care activities       has a past medical history of Hypertension and PONV (postoperative nausea and vomiting). reports that she quit smoking about 18 years ago. She has never used smokeless tobacco. She reports that she does not drink alcohol and does not use drugs. family history is not on file. Prior to Admission medications    Medication Sig Start Date End Date Taking? Authorizing Provider   diazePAM (VALIUM) 5 MG tablet Take 5 mg by mouth every 8 hours as needed.  22 Yes Historical Provider, MD   HYDROcodone-acetaminophen (NORCO) 5-325 MG per tablet Take 1-2 tablets by mouth every 4 hours as needed. 11/10/22 11/13/22 Yes Historical Provider, MD   ondansetron (ZOFRAN-ODT) 4 MG disintegrating tablet Take 4 mg by mouth every 8 hours as needed 11/10/22 11/13/22 Yes Historical Provider, MD   acetaminophen (TYLENOL) 325 MG tablet Take 650 mg by mouth every 4 hours as needed 11/10/22  Yes Historical Provider, MD   atorvastatin (LIPITOR) 10 MG tablet Take 10 mg by mouth at bedtime 8/2/22  Yes Historical Provider, MD   metoprolol succinate (TOPROL XL) 25 MG extended release tablet Take 25 mg by mouth 9/1/22  Yes Historical Provider, MD   polyethylene glycol (GLYCOLAX) 17 GM/SCOOP powder Take 17 g by mouth daily 11/11/22   Historical Provider, MD   dextran 70-hypromellose (TEARS NATURALE) 0.1-0.3 % SOLN opthalmic solution Apply 1 drop to eye 2 times daily    Historical Provider, MD   COLLAGEN PO Take 1 capsule by mouth daily    Historical Provider, MD   VITAMIN D PO Take 1 capsule by mouth daily    Historical Provider, MD   diphenhydrAMINE (BENADRYL) 25 MG capsule Take 25 mg by mouth nightly as needed    Historical Provider, MD   omeprazole (PRILOSEC) 20 MG delayed release capsule Take 20 mg by mouth daily    Historical Provider, MD   Probiotic Product (ACIDOPHILUS/GOAT MILK) CAPS Take 1 capsule by mouth daily    Historical Provider, MD   lisinopril (PRINIVIL;ZESTRIL) 5 MG tablet Take 5 mg by mouth daily    Historical Provider, MD   Cholecalciferol (VITAMIN D3) 2000 UNITS CAPS Take 1,000 Units by mouth daily    Historical Provider, MD   Multiple Vitamins-Minerals (MULTIVITAMIN ADULT PO) Take 1 tablet by mouth daily    Historical Provider, MD   B Complex Vitamins (VITAMIN B COMPLEX PO) Take 1 tablet by mouth daily    Historical Provider, MD         REVIEW OF SYSTEMS:   CONSTITUTIONAL: negative for fevers, chills, diaphoresis, activity change, appetite change, fatigue, night sweats and unexpected weight change.   EYES: negative for blurred vision, eye discharge, visual disturbance and icterus. HEENT: negative for hearing loss, tinnitus, ear drainage, sinus pressure, nasal congestion, epistaxis and snoring. RESPIRATORY: Negative for hemoptysis, cough, sputum production. CARDIOVASCULAR: negative for chest pain, palpitations, exertional chest pressure/discomfort, edema, syncope. + HTN. HLD  GASTROINTESTINAL: negative for nausea, vomiting, diarrhea, constipation, blood in stool and abdominal pain. GENITOURINARY: negative for frequency, dysuria, urinary incontinence, decreased urine volume, and hematuria. HEMATOLOGIC/LYMPHATIC: negative for easy bruising, bleeding and lymphadenopathy. ALLERGIC/IMMUNOLOGIC: negative for recurrent infections, angioedema, anaphylaxis and drug reactions. ENDOCRINE: negative for weight changes and diabetic symptoms including polyuria, polydipsia and polyphagia. MUSCULOSKELETAL: negative for pain, joint swelling, decreased range of motion and muscle weakness. + Chronic low back pain, spinal stenosis  NEUROLOGICAL: negative for headaches, slurred speech, unilateral weakness. PSYCHIATRIC/BEHAVIORAL: negative for hallucinations, behavioral problems, confusion and agitation. All pertinent positives are noted in the HPI.     Physical Examination:  Vitals: Patient Vitals for the past 24 hrs:   BP Temp Temp src Pulse Resp SpO2 Height Weight   11/11/22 0950 -- -- -- -- 16 -- -- --   11/11/22 0845 (!) 155/87 98 °F (36.7 °C) Oral 94 16 92 % -- --   11/11/22 0827 -- -- -- -- -- 97 % -- --   11/11/22 0530 (!) 150/83 98.1 °F (36.7 °C) -- (!) 101 16 98 % -- 143 lb 8.3 oz (65.1 kg)   11/11/22 0128 -- -- -- -- 16 -- -- --   11/10/22 1941 -- -- -- -- 16 -- -- --   11/10/22 1930 (!) 166/89 98.1 °F (36.7 °C) Oral 86 16 93 % -- --   11/10/22 1815 -- -- -- -- 16 96 % -- --   11/10/22 1524 -- -- -- -- 16 -- -- --   11/10/22 1500 -- -- -- -- -- -- 5' 1\" (1.549 m) 139 lb 15.9 oz (63.5 kg)   11/10/22 1322 (!) 142/85 97.8 °F (36.6 °C) Oral 87 16 97 % -- -- Psych: Stable mood, normal judgement, normal affect   Const: No distress  Eyes: Conjunctiva noninjected, no icterus noted; pupils equal, round, and reactive to light. HENT: Atraumatic, normocephalic; Oral mucosa moist  Neck: Trachea midline, neck supple. No thyromegaly noted. CV: Regular rate and rhythm, grade 2/6 systolic ejection murmur noted   Resp: Lungs clear to auscultation bilaterally, no rales wheezes or ronchi, no retractions. Respirations unlabored. GI: Soft, nontender, nondistended. Normal bowel sounds. No palpable masses. Neuro: Alert, oriented, appropriate. No cranial nerve deficits appreciated. Motor examination reveals normal strength in uppers, 4+/5 strength in lower extremities   Skin: Normal temperature and turgor  MSK: No joint abnormalities noted. Ext: No significant edema appreciated. No varicosities. Lab Results   Component Value Date    WBC 7.9 12/04/2020    HGB 13.8 12/04/2020    HCT 40.3 12/04/2020    MCV 87.1 12/04/2020     12/04/2020     Lab Results   Component Value Date    INR 1.02 11/11/2022    PROTIME 13.3 11/11/2022     Lab Results   Component Value Date    CREATININE 0.6 11/11/2022    BUN 15 11/11/2022     11/11/2022    K 3.6 11/11/2022     11/11/2022    CO2 27 11/11/2022     Lab Results   Component Value Date    ALT 17 04/06/2016    AST 20 04/06/2016    ALKPHOS 106 04/06/2016    BILITOT 0.6 04/06/2016       No results found. The above labs and diagnostic studies have been reviewed by myself upon admission to inpatient rehabilitation. Barriers to Discharge: Patient  has a past medical history of Hypertension and PONV (postoperative nausea and vomiting). Patient lives at home with her  in a 1 - level house. POST ADMISSION PHYSICIAN EVALUATION  The patient has agreed to being admitted to our comprehensive inpatient  rehabilitation facility consisting of at least 180 minutes of therapy a day,  5 out of 7 days a week.     The patient/family has a good understanding of our discharge process. The  patient has potential to make improvement and is in need of at least two of  the following multidisciplinary therapies including but not limited to  physical, occupational, respiratory, and speech, nutritional services, wound care,   and prosthetics and orthotics. Given the patients complex condition  and risk of further medical complications, rehabilitation services cannot be  safely provided at a lower level of care such as a skilled nursing facility. I have compared the patients medical and functional status at the time of the  preadmission screening and the same on this date, and there are no significant changes. By signing this document, I acknowledge that I have personally performed a  full physical examination on this patient within 24 hours of admission to  this inpatient rehabilitation facility and have determined the patient to be  able to tolerate the above course of treatment at an intensive level for a  reasonable period of time. I will be completing a detailed individualized  Plan of Care for this patient by day four of the patients stay based upon the  Preadmission Screen, this Post-Admission Evaluation, and the therapy  evaluations. Assessment and Plan:    L4-S1 anterior lumbar interbody fusion with pedicle screw fixation and laminectomy and decompression procedure. , 11/3, Dr. Raiza Shah    HTN-lisinopril, Toprol    HLD-Lipitor    Bowels: Schedule Miralax + Senna S. Follow bowel movements. Enema or suppository if needed. Bladder: Check PVR x 3. [address] if PVR > 200ml or if any volume is > 500 ml. Pain: Hydrocodone is ordered prn.        Juanito More MD, 11/11/2022, 10:03 AM

## 2022-11-12 PROCEDURE — 6360000002 HC RX W HCPCS: Performed by: PHYSICAL MEDICINE & REHABILITATION

## 2022-11-12 PROCEDURE — 97530 THERAPEUTIC ACTIVITIES: CPT

## 2022-11-12 PROCEDURE — 97110 THERAPEUTIC EXERCISES: CPT

## 2022-11-12 PROCEDURE — 94760 N-INVAS EAR/PLS OXIMETRY 1: CPT

## 2022-11-12 PROCEDURE — 97116 GAIT TRAINING THERAPY: CPT

## 2022-11-12 PROCEDURE — 6370000000 HC RX 637 (ALT 250 FOR IP): Performed by: PHYSICAL MEDICINE & REHABILITATION

## 2022-11-12 PROCEDURE — 97535 SELF CARE MNGMENT TRAINING: CPT

## 2022-11-12 PROCEDURE — 1280000000 HC REHAB R&B

## 2022-11-12 RX ORDER — HYDROCODONE BITARTRATE AND ACETAMINOPHEN 5; 325 MG/1; MG/1
1 TABLET ORAL EVERY 4 HOURS PRN
Status: DISCONTINUED | OUTPATIENT
Start: 2022-11-12 | End: 2022-11-18 | Stop reason: HOSPADM

## 2022-11-12 RX ADMIN — HYDROCODONE BITARTRATE AND ACETAMINOPHEN 2 TABLET: 5; 325 TABLET ORAL at 08:30

## 2022-11-12 RX ADMIN — Medication 2000 UNITS: at 08:16

## 2022-11-12 RX ADMIN — ATORVASTATIN CALCIUM 10 MG: 10 TABLET, FILM COATED ORAL at 20:57

## 2022-11-12 RX ADMIN — SENNOSIDES AND DOCUSATE SODIUM 2 TABLET: 50; 8.6 TABLET ORAL at 08:16

## 2022-11-12 RX ADMIN — DIAZEPAM 5 MG: 5 TABLET ORAL at 08:30

## 2022-11-12 RX ADMIN — POLYETHYLENE GLYCOL 3350 17 G: 17 POWDER, FOR SOLUTION ORAL at 08:16

## 2022-11-12 RX ADMIN — ENOXAPARIN SODIUM 40 MG: 100 INJECTION SUBCUTANEOUS at 20:57

## 2022-11-12 RX ADMIN — Medication 400 MG: at 20:57

## 2022-11-12 RX ADMIN — DIAZEPAM 5 MG: 5 TABLET ORAL at 16:40

## 2022-11-12 RX ADMIN — LISINOPRIL 40 MG: 40 TABLET ORAL at 08:16

## 2022-11-12 RX ADMIN — Medication 1 TABLET: at 08:16

## 2022-11-12 RX ADMIN — DIPHENHYDRAMINE HCL 25 MG: 25 TABLET ORAL at 20:57

## 2022-11-12 RX ADMIN — HYDROCODONE BITARTRATE AND ACETAMINOPHEN 2 TABLET: 5; 325 TABLET ORAL at 17:37

## 2022-11-12 RX ADMIN — HYDROCODONE BITARTRATE AND ACETAMINOPHEN 2 TABLET: 5; 325 TABLET ORAL at 21:43

## 2022-11-12 RX ADMIN — Medication 1 CAPSULE: at 08:16

## 2022-11-12 RX ADMIN — METOPROLOL SUCCINATE 25 MG: 25 TABLET, EXTENDED RELEASE ORAL at 08:17

## 2022-11-12 RX ADMIN — Medication 400 MG: at 08:16

## 2022-11-12 RX ADMIN — HYDROCODONE BITARTRATE AND ACETAMINOPHEN 1 TABLET: 5; 325 TABLET ORAL at 13:13

## 2022-11-12 RX ADMIN — PANTOPRAZOLE SODIUM 40 MG: 40 TABLET, DELAYED RELEASE ORAL at 06:22

## 2022-11-12 RX ADMIN — HYDROCODONE BITARTRATE AND ACETAMINOPHEN 2 TABLET: 5; 325 TABLET ORAL at 02:28

## 2022-11-12 RX ADMIN — DIPHENHYDRAMINE HCL 25 MG: 25 TABLET ORAL at 02:25

## 2022-11-12 ASSESSMENT — PAIN DESCRIPTION - LOCATION
LOCATION: LEG

## 2022-11-12 ASSESSMENT — PAIN DESCRIPTION - PAIN TYPE
TYPE: ACUTE PAIN

## 2022-11-12 ASSESSMENT — PAIN DESCRIPTION - DESCRIPTORS
DESCRIPTORS: ACHING;SPASM
DESCRIPTORS: ACHING
DESCRIPTORS: ACHING
DESCRIPTORS: SPASM
DESCRIPTORS: ACHING

## 2022-11-12 ASSESSMENT — PAIN DESCRIPTION - ORIENTATION
ORIENTATION: LEFT

## 2022-11-12 ASSESSMENT — PAIN SCALES - GENERAL
PAINLEVEL_OUTOF10: 8
PAINLEVEL_OUTOF10: 5
PAINLEVEL_OUTOF10: 7
PAINLEVEL_OUTOF10: 0
PAINLEVEL_OUTOF10: 2
PAINLEVEL_OUTOF10: 7
PAINLEVEL_OUTOF10: 8
PAINLEVEL_OUTOF10: 0
PAINLEVEL_OUTOF10: 4

## 2022-11-12 ASSESSMENT — PAIN DESCRIPTION - ONSET
ONSET: ON-GOING
ONSET: ON-GOING

## 2022-11-12 ASSESSMENT — PAIN DESCRIPTION - FREQUENCY
FREQUENCY: CONTINUOUS
FREQUENCY: CONTINUOUS

## 2022-11-12 ASSESSMENT — PAIN - FUNCTIONAL ASSESSMENT: PAIN_FUNCTIONAL_ASSESSMENT: ACTIVITIES ARE NOT PREVENTED

## 2022-11-12 NOTE — PROGRESS NOTES
Occupational Therapy  Facility/Department: [facility]  Rehabilitation Occupational Therapy Daily Treatment Note    Date: 22  Patient Name: Fahad Cagle       Room: Y5H-0772/2957-81  MRN: [id number]  Account: [de-identified]   : 1950  (73 y.o.) Gender: female                    Past Medical History:  has a past medical history of Hypertension and PONV (postoperative nausea and vomiting). Past Surgical History:   has a past surgical history that includes Tonsillectomy; Tubal ligation; and cyst removal.    Restrictions  Restrictions/Precautions: General Precautions; Fall Risk  Other position/activity restrictions: 2 incisions on lower back, 1 incision on lower stomach    Subjective  Subjective: First session: Pt met b/s for OT tx. Pt in bed on arrival, agreeable to participate in therapy. Pt reports 5/10 pain L thigh. Second session: Pt met b/s for OT second session. Pt seated in recliner talking on phone on arrival, agreeable to participate in therapy for ADLs. Restrictions/Precautions: General Precautions; Fall Risk             Objective     Cognition  Safety Judgement: Decreased awareness of need for safety (reminders for spinal precautions and hand placement with fxl transfers)  Problem Solving: Decreased awareness of errors  Orientation  Overall Orientation Status: Within Normal Limits         ADL  Feeding  Assistance Level: Independent  Grooming/Oral Hygiene  Assistance Level: Stand by assist;Set-up  Skilled Clinical Factors: standing at sink to wash hands and brush teeth, but fatigued while brushing teeth requiring chair be brought up for seated rest break. Completed remained of brushing teeth and comb hair in seated.   Upper Extremity Bathing  Assistance Level: Stand by assist;Set-up  Skilled Clinical Factors: seated on shower chiar  Lower Extremity Bathing  Equipment Provided: Long-handled sponge  Assistance Level: Minimal assistance  Skilled Clinical Factors: pt used LH sponge to wash LE's SBA, VC's to maintain spinal precautions. Pt stood at grab bars with wash periarea/buttocks with SBA, but needed assist to dry buttocks and backs of thighs in stance at RW  Upper Extremity Dressing  Assistance Level: Moderate assistance;Minimal assistance  Skilled Clinical Factors: doffed shirt/bra SBA, min A to don bra, mod A to don button up shirt  Lower Extremity Dressing  Equipment Provided: Reachers;Sock aid;Long-handled shoe-horn  Assistance Level: Moderate assistance  Skilled Clinical Factors: to don/doff pants, underwear using A/E to maintain spinal precautions with LB dressing. Pt educated to don L LE first d/t L LE more painful, but still needed assist despite attempt at use of reacher. SBA to manage clothing up over hips. Pt administered elastic laces for shoes. Putting On/Taking Off Footwear  Equipment Provided: Sock aid  Assistance Level: Moderate assistance  Skilled Clinical Factors: socks/shoes. Pt able to doff R sock using shoe horn and don with sock aid and set-up, but still needed assist to don/doff L sock d/t pain  Toileting  Assistance Level: Stand by assist;Contact guard assist  Skilled Clinical Factors: to void urine at commode x2 trials, SBA to manage clothing down/up at RW and for [employer] Transfers  Equipment: Standard toilet  Additional Factors: With handrails  Assistance Level: Stand by assist  Skilled Clinical Factors: VC's to keep RW with her throughout transfer as pt tends to leave RW off to side  Tub/Shower Transfers  Type: Shower  Transfer From: Rolling walker  Transfer To: Shower chair with back  Additional Factors: Cues for hand placement; With handrails  Assistance Level: Stand by assist;Contact guard assist    Comment: Covered incisions on back and stomach with AquaGuard prior to shower.            Functional Mobility  Device: Rolling walker  Activity: To/From bathroom  Assistance Level: Contact guard assist;Stand by assist  Skilled Clinical Factors: Pt completed fxl mobility ~10 ft, ~25 ft first session with RW and SBA/CGA; ~25 ft, ~4 ft x2, ~20 ft with RW and SBA/CGA. Roll Right  Assistance Level: Contact guard assist  Sit to Supine  Assistance Level: Moderate assistance  Skilled Clinical Factors: using logroll technique with VC's, assist B LE's  Transfers  Surface: To chair with arms;Standard toilet;From chair with arms  Sit to Stand  Assistance Level: Stand by assist;Contact guard assist  Stand to Sit  Assistance Level: Stand by assist;Contact guard assist  Bed To/From Chair  Assistance Level: Stand by assist;Contact guard assist  Skilled Clinical Factors: with RW   OT Exercises  Exercise Treatment: Pt completed B UE therex using 2# weight to improve strength for ADLs and fxl transfers. VC's for form during therex. A/AROM Exercises: elbow flexion x20, supination/pronation x20, wrist flexion/ext x20, shoulder flexion x10 to mid range, shoulder horizontal abd/add x10     Assessment  Assessment  Activity Tolerance: Patient limited by pain; Patient limited by fatigue  Discharge Recommendations: Continue to assess pending progress;Home with assist PRN;Patient would benefit from continued therapy after discharge;Home with Home health OT  OT Equipment Recommendations  Equipment Needed: Yes  ADL Assistive Devices: Long-handled Shoe Horn;Shower Chair with back  Other: Cont to assess, pt has RW, reacher, long sponge, sock aid  Safety Devices  Safety Devices in place: Yes  Type of devices: Call light within reach; Chair alarm in place; Left in chair;Gait belt;Nurse notified    Patient Education  Education  Education Given To: Patient  Education Provided: Role of Therapy;Plan of Care;ADL Function;Precautions; Safety;Transfer Training;Equipment;Home Exercise Program  Barriers to Learning: None  Education Outcome: Verbalized understanding;Demonstrated understanding    Plan  Occupational Therapy Plan  Times Per Week: 5-6  Times Per Day: Twice a day  Days Per Week: 5 Days  Hours Per Day: 1.5 hours  Therapy Duration: 10 Days (7-10 Days)  Current Treatment Recommendations: Strengthening;Balance training;Functional mobility training; Endurance training;Pain management; Safety education & training;Patient/Caregiver education & training;Equipment evaluation, education, & procurement;Self-Care / ADL; Home management training    Goals  Patient Goals   Patient goals : pt stated she wants to start feeling better  Short Term Goals  Time Frame for Short Term Goals: in 7-10 days pt will be. ..   Short Term Goal 1: indep with toileting using grab bars PRN  Short Term Goal 2: indep with bathing using AE to maintain spinal precautions  Short Term Goal 3: indep with UB dressing  Short Term Goal 4: indep with LB dressing (exception of CHANA hose) using AE to maintain spinal precautions  Short Term Goal 5: indep with functional mobility and transfers using LRAD while maintaining spinal precautions  Additional Goals?: Yes  Short Term Goal 6: indep with IADL tasks using LRAD and maintaining spinal precautions                Therapy Time   Individual Concurrent Group Co-treatment   Time In 0729         Time Out 0814         Minutes 45             Second Session Therapy Time     Individual Co-treatment   Time In 1135     Time Out 1235     Minutes Lise Mera 26, OTR/L [id number]

## 2022-11-12 NOTE — PROGRESS NOTES
Patient admitted to rehab with fusion of spine  A/Ox4. Transfers with walker. Mobility restrictions: spinal/back safety. On regualr diet, tolerating well. Medications taken whole. On Lovenox for DVT prophylaxis. Skin: surgical glue in place. Has been continent of bowel and  of bladder. LBM today, prior bm 11/08/2022 states premorbid issues with constipation. . Chair/bed alarms in use and call light in reach. Will monitor for safety.

## 2022-11-12 NOTE — PROGRESS NOTES
Patient admitted to rehab with alcoholic encephalopathy. A/Ox4, but recall, cognition varies. Transfers with walker or stand pivot. On regular diet, tolerating well. Medications taken whole. Has been continent of bowel and continent and incontinent of bladder. reminders for bladder schedule LBM today. Chair/bed alarms in use and call light in reach. Will monitor for safety.

## 2022-11-12 NOTE — PROGRESS NOTES
Physical Therapy  Facility/Department: [facility]  Rehabilitation Physical Therapy Treatment Note    NAME: John Julio  : 1950 (67 y.o.)  MRN: [id number]  CODE STATUS: Full Code    Date of Service: 22       Restrictions:  Restrictions/Precautions: General Precautions; Fall Risk  Position Activity Restriction  Spinal Precautions: No Bending; No Lifting; No Twisting  Other position/activity restrictions: 2 incisions on lower back, 1 incision on lower stomach     SUBJECTIVE  Subjective  Subjective: Pt sitting up in recliner talking on the phone to a friend. Pt willing to participate in therapy w/o encouragement.  States she continues to have L thigh pain 5/10 at rest.        Post Treatment Pain Screening         OBJECTIVE       Functional Mobility  Bed Mobility  Overall Assistance Level: Minimal Assistance  Bridging  Assistance Level: Contact guard assist  Roll Right  Assistance Level: Contact guard assist  Sit to Supine  Assistance Level: Minimal assistance (for L LE)  Skilled Clinical Factors: verbal cues for technique -- pt asking for more assistance but able to complete with min A with encouragement  Supine to Sit  Assistance Level: Minimal assistance  Skilled Clinical Factors: HOB elevated slightly, use of R bed rail  Scooting  Assistance Level: Minimal assistance  Balance  Sitting Balance: Independent (midline and upright)  Standing Balance: Stand by assistance  Standing Balance  Activity: standing at walker with no UE support to pull up briefs/pants after BM, SBA  Comments: steady  Sit to Stand  Assistance Level: Stand by assist;Contact guard assist  Skilled Clinical Factors: verbal cues for hand placement  Stand to Sit  Assistance Level: Stand by assist  Skilled Clinical Factors: verbal cues for hand placement, forgets to reach back for arm rests of chair  Bed To/From Chair  Assistance Level: Stand by assist (with walker)      Environmental Mobility  Ambulation  Device: Rolling walker  Assistance Level: Stand by assist;Supervision  Gait Deviations: Slow quan;Decreased step length bilateral;Decreased weight shift bilateral;Decreased arm swing bilateral;Decreased trunk rotation (flexed slightly at hips, tends to walk behind walker base)  Distance: 4 x 150'  Skilled Clinical Factors: verbal cues to keep eyes/head up and look forward  Stairs  Stair Height: 6''  Device: Standard walker  Number of Stairs: 1  Additional Factors: Verbal cues (leading up and down with R LE)  Assistance Level: Contact guard assist  Skilled Clinical Factors: Pt able to recall sequencing for bringing walker up/down on curb step but forgot sequencing for ascending with stronger R leg and descending leading with weaker L leg  Curb  Curb Height:  (see above for curb step)             PT Exercises  Exercise Equipment: SEATED: AP x 10 reps, seated L knee flexion x 10. SUPINE: QS x 10 reps, SAQ x 10 reps, L heel slide x 10 reps, GS x 10 reps. ASSESSMENT/PROGRESS TOWARDS GOALS            Goals  Patient Goals   Patient Goals : Patient's goal is to go home and take care of herself  Short Term Goals  Time Frame for Short Term Goals: 7-10 days  Short Term Goal 1: transfer with MI  Short Term Goal 2: Bed mobility with MI  Short Term Goal 3: ambulate with wheeled walker 150' with MI  Short Term Goal 4: ascend/descend curb step with wheeled walker with SBA  Long Term Goals  Time Frame for Long Term Goals : STG= LTG    PLAN OF CARE/SAFETY  Physcial Therapy Plan  General Plan:  minutes of therapy at least 5 out of 7 days a week  Days Per Week: 5 Days  Hours Per Day: 1.5 hours  Therapy Duration: 10 Days (7-10 days)  Specific Instructions for Next Treatment: curb step assess  Current Treatment Recommendations: Strengthening;Balance training;Functional mobility training;Transfer training;ADL/Self-care training; Endurance training;Gait training;Stair training;Pain management;Home exercise program;Safety education & training;Patient/Caregiver education & training;Equipment evaluation, education, & procurement;Positioning; Therapeutic activities  Safety Devices  Type of Devices: Call light within reach; Patient at risk for falls; All fall risk precautions in place;Gait belt;Left in chair;Chair alarm in place    EDUCATION  Education  Education Given To: Patient  Education Provided: Role of Therapy;Plan of Care;Precautions; Safety;ADL Function;Mobility Training;Transfer Training;Home Exercise Program;Family Education;Equipment  Education Method: Demonstration;Verbal;Teach Back  Barriers to Learning: None  Education Outcome: Verbalized understanding;Continued education needed;Demonstrated understanding        Therapy Time   Individual Concurrent Group Co-treatment   Time In 0928         Time Out 1058         Minutes 90           Timed Code Treatment Minutes: 90 Minutes       Jeb Cardenas PT, 11/12/22 at 10:58 AM

## 2022-11-12 NOTE — PLAN OF CARE
Problem: Safety - Adult  Goal: Free from fall injury  11/12/2022 0013 by Mitul Yang RN  Outcome: Progressing  Flowsheets (Taken 11/12/2022 0013)  Free From Fall Injury: Based on caregiver fall risk screen, instruct family/caregiver to ask for assistance with transferring infant if caregiver noted to have fall risk factors  Note: Pt educated on falls precautions and safety. Call light and personal belongings within reach at all times. Non skid socks in use when up. Hourly rounding and alarms active. Problem: Pain  Goal: Verbalizes/displays adequate comfort level or baseline comfort level  11/12/2022 0013 by Mitul Yang RN  Outcome: Progressing  Flowsheets  Taken 11/12/2022 0013 by Mitul Yang RN  Verbalizes/displays adequate comfort level or baseline comfort level:   Administer analgesics based on type and severity of pain and evaluate response   Consider cultural and social influences on pain and pain management  Taken 11/11/2022 1530 by Juanito Porter RN  Verbalizes/displays adequate comfort level or baseline comfort level:   Encourage patient to monitor pain and request assistance   Assess pain using appropriate pain scale   Administer analgesics based on type and severity of pain and evaluate response   Implement non-pharmacological measures as appropriate and evaluate response  Taken 11/11/2022 1500 by Juanito Porter RN  Verbalizes/displays adequate comfort level or baseline comfort level:   Assess pain using appropriate pain scale   Encourage patient to monitor pain and request assistance   Administer analgesics based on type and severity of pain and evaluate response   Implement non-pharmacological measures as appropriate and evaluate response  Note: Able to rate pain using a 1-10 scale, medicated per prn orders, see MAR.  Able to verbalize a reduction in pain and/or able to fall asleep and remain asleep without any s/s of pain

## 2022-11-12 NOTE — PLAN OF CARE
Problem: Discharge Planning  Goal: Discharge to home or other facility with appropriate resources  11/12/2022 1145 by Shazia Mcdowell RN  Outcome: Progressing  Flowsheets  Taken 11/12/2022 1145  Discharge to home or other facility with appropriate resources:   Identify discharge learning needs (meds, wound care, etc)   Arrange for needed discharge resources and transportation as appropriate   Identify barriers to discharge with patient and caregiver  Taken 11/12/2022 0812  Discharge to home or other facility with appropriate resources:   Arrange for needed discharge resources and transportation as appropriate   Identify discharge learning needs (meds, wound care, etc)  Note: Medication reviewed. 11/12/2022 0013 by Rosa Glavan RN  Outcome: Progressing     Problem: Safety - Adult  Goal: Free from fall injury  11/12/2022 1145 by Shazia Mcdowell RN  Outcome: Progressing  Flowsheets  Taken 11/12/2022 1145  Free From Fall Injury: Instruct family/caregiver on patient safety  Taken 11/12/2022 1059  Free From Fall Injury: Instruct family/caregiver on patient safety  Note: Calls for needs, reviewed back safety. 11/12/2022 0013 by Rosa Galvan RN  Outcome: Progressing  Flowsheets (Taken 11/12/2022 0013)  Free From Fall Injury: Based on caregiver fall risk screen, instruct family/caregiver to ask for assistance with transferring infant if caregiver noted to have fall risk factors  Note: Pt educated on falls precautions and safety. Call light and personal belongings within reach at all times. Non skid socks in use when up. Hourly rounding and alarms active.        Problem: ABCDS Injury Assessment  Goal: Absence of physical injury  11/12/2022 1145 by Shazia Mcdowell RN  Outcome: Progressing  Flowsheets (Taken 11/12/2022 1145)  Absence of Physical Injury: Implement safety measures based on patient assessment  11/12/2022 0013 by Rosa Galvan RN  Outcome: Progressing     Problem: Pain  Goal: Verbalizes/displays adequate comfort level or baseline comfort level  11/12/2022 1145 by Pranay Issa RN  Outcome: Progressing  Flowsheets (Taken 11/12/2022 1145)  Verbalizes/displays adequate comfort level or baseline comfort level:   Encourage patient to monitor pain and request assistance   Assess pain using appropriate pain scale   Administer analgesics based on type and severity of pain and evaluate response   Implement non-pharmacological measures as appropriate and evaluate response  Note: Pain medication and valium effective. 11/12/2022 0013 by Raisa Robbins RN  Outcome: Progressing  Flowsheets  Taken 11/12/2022 0013 by Raisa Robbins RN  Verbalizes/displays adequate comfort level or baseline comfort level:   Administer analgesics based on type and severity of pain and evaluate response   Consider cultural and social influences on pain and pain management  Taken 11/11/2022 1530 by Ronan Garcia RN  Verbalizes/displays adequate comfort level or baseline comfort level:   Encourage patient to monitor pain and request assistance   Assess pain using appropriate pain scale   Administer analgesics based on type and severity of pain and evaluate response   Implement non-pharmacological measures as appropriate and evaluate response  Taken 11/11/2022 1500 by Ronan Garcia RN  Verbalizes/displays adequate comfort level or baseline comfort level:   Assess pain using appropriate pain scale   Encourage patient to monitor pain and request assistance   Administer analgesics based on type and severity of pain and evaluate response   Implement non-pharmacological measures as appropriate and evaluate response  Note: Able to rate pain using a 1-10 scale, medicated per prn orders, see MAR.  Able to verbalize a reduction in pain and/or able to fall asleep and remain asleep without any s/s of pain       Problem: Nutrition Deficit:  Goal: Optimize nutritional status  11/12/2022 1145 by Pranay Issa RN  Outcome: Progressing  Flowsheets (Taken 11/12/2022 1145)  Nutrient intake appropriate for improving, restoring, or maintaining nutritional needs: Assess nutritional status and recommend course of action  Note: Adequate.   11/12/2022 0013 by Jr Jalloh RN  Outcome: Progressing

## 2022-11-13 PROCEDURE — 94760 N-INVAS EAR/PLS OXIMETRY 1: CPT

## 2022-11-13 PROCEDURE — 1280000000 HC REHAB R&B

## 2022-11-13 PROCEDURE — 6370000000 HC RX 637 (ALT 250 FOR IP): Performed by: PHYSICAL MEDICINE & REHABILITATION

## 2022-11-13 PROCEDURE — 6360000002 HC RX W HCPCS: Performed by: PHYSICAL MEDICINE & REHABILITATION

## 2022-11-13 RX ADMIN — DIAZEPAM 5 MG: 5 TABLET ORAL at 08:40

## 2022-11-13 RX ADMIN — Medication 1 CAPSULE: at 08:36

## 2022-11-13 RX ADMIN — DIAZEPAM 5 MG: 5 TABLET ORAL at 19:07

## 2022-11-13 RX ADMIN — ENOXAPARIN SODIUM 40 MG: 100 INJECTION SUBCUTANEOUS at 20:54

## 2022-11-13 RX ADMIN — Medication 1 CAPSULE: at 08:35

## 2022-11-13 RX ADMIN — HYDROCODONE BITARTRATE AND ACETAMINOPHEN 2 TABLET: 5; 325 TABLET ORAL at 16:34

## 2022-11-13 RX ADMIN — Medication 2000 UNITS: at 08:36

## 2022-11-13 RX ADMIN — Medication 400 MG: at 20:53

## 2022-11-13 RX ADMIN — ATORVASTATIN CALCIUM 10 MG: 10 TABLET, FILM COATED ORAL at 20:53

## 2022-11-13 RX ADMIN — DIPHENHYDRAMINE HCL 25 MG: 25 TABLET ORAL at 20:53

## 2022-11-13 RX ADMIN — HYDROCODONE BITARTRATE AND ACETAMINOPHEN 2 TABLET: 5; 325 TABLET ORAL at 20:54

## 2022-11-13 RX ADMIN — Medication 1 TABLET: at 08:36

## 2022-11-13 RX ADMIN — HYDROCODONE BITARTRATE AND ACETAMINOPHEN 2 TABLET: 5; 325 TABLET ORAL at 04:50

## 2022-11-13 RX ADMIN — PANTOPRAZOLE SODIUM 40 MG: 40 TABLET, DELAYED RELEASE ORAL at 05:47

## 2022-11-13 RX ADMIN — LISINOPRIL 40 MG: 40 TABLET ORAL at 08:37

## 2022-11-13 RX ADMIN — Medication 400 MG: at 08:36

## 2022-11-13 RX ADMIN — METOPROLOL SUCCINATE 25 MG: 25 TABLET, EXTENDED RELEASE ORAL at 08:36

## 2022-11-13 RX ADMIN — HYDROCODONE BITARTRATE AND ACETAMINOPHEN 2 TABLET: 5; 325 TABLET ORAL at 10:18

## 2022-11-13 ASSESSMENT — PAIN DESCRIPTION - ONSET: ONSET: ON-GOING

## 2022-11-13 ASSESSMENT — PAIN DESCRIPTION - ORIENTATION
ORIENTATION: LEFT;LOWER
ORIENTATION: LOWER
ORIENTATION: LEFT
ORIENTATION: LEFT;LOWER

## 2022-11-13 ASSESSMENT — PAIN DESCRIPTION - LOCATION
LOCATION: BACK
LOCATION: LEG
LOCATION: LEG;BACK
LOCATION: BACK;LEG

## 2022-11-13 ASSESSMENT — PAIN DESCRIPTION - DESCRIPTORS
DESCRIPTORS: ACHING
DESCRIPTORS: ACHING
DESCRIPTORS: DULL;THROBBING
DESCRIPTORS: ACHING

## 2022-11-13 ASSESSMENT — PAIN SCALES - GENERAL
PAINLEVEL_OUTOF10: 7
PAINLEVEL_OUTOF10: 7
PAINLEVEL_OUTOF10: 0
PAINLEVEL_OUTOF10: 7
PAINLEVEL_OUTOF10: 5
PAINLEVEL_OUTOF10: 5
PAINLEVEL_OUTOF10: 2
PAINLEVEL_OUTOF10: 7
PAINLEVEL_OUTOF10: 2

## 2022-11-13 ASSESSMENT — PAIN DESCRIPTION - PAIN TYPE
TYPE: ACUTE PAIN;CHRONIC PAIN;SURGICAL PAIN
TYPE: ACUTE PAIN
TYPE: ACUTE PAIN;SURGICAL PAIN

## 2022-11-13 ASSESSMENT — PAIN DESCRIPTION - DIRECTION: RADIATING_TOWARDS: LEFT THIGH

## 2022-11-13 ASSESSMENT — PAIN DESCRIPTION - FREQUENCY: FREQUENCY: CONTINUOUS

## 2022-11-13 ASSESSMENT — PAIN - FUNCTIONAL ASSESSMENT: PAIN_FUNCTIONAL_ASSESSMENT: ACTIVITIES ARE NOT PREVENTED

## 2022-11-13 NOTE — PROGRESS NOTES
Patient admitted to rehab with fusion of spine lumbar region. A/Ox4. Transfers with walker. Mobility restrictions: reviewed back safety. On regular diet, tolerating well. Medications taken whole. On Lovenox for DVT prophylaxis. Skin: surgical glue in place to abd and back. Has been continent of bowel and of bladder. LBM today. Chair/bed alarms in use and call light in reach. Will monitor for safety.

## 2022-11-13 NOTE — PROGRESS NOTES
Patient pleasant and cooperative this evening. Up with walker to bathroom several times with front wheeled walker, gait belt, and assist of 1. Patient tolerating well. Denied pain or discomfort and was encouraged to inform staff of any changes in condition. Patient refused evening Lantus dose of 5units. Patient also refused senokot this evening, stating he had a large BM in am.  Fall precautions in place. Call light and personal belongings are within reach.

## 2022-11-13 NOTE — PROGRESS NOTES
Department of Physical Medicine & Rehabilitation  Progress Note    Patient Identification:  Pleas Epley  [id number]  : 1950  Admit date: 11/10/2022    Chief Complaint: Fusion of spine of lumbar region    Subjective:   No acute events overnight. Patient seen this am sitting up in gym. Reports therapy going well. Pain controlled. Labs reviewed. ROS: No f/c, n/v, cp     Objective:  Patient Vitals for the past 24 hrs:   BP Temp Temp src Pulse Resp SpO2 Weight   22 0930 139/75 98.2 °F (36.8 °C) Oral 100 16 92 % --   22 0928 -- -- -- -- 16 91 % --   22 0525 (!) 143/76 97.9 °F (36.6 °C) Oral 90 16 92 % 138 lb 0.1 oz (62.6 kg)   22 0334 -- -- -- -- 16 -- --   22 0326 127/81 98.3 °F (36.8 °C) Oral 95 16 90 % 142 lb 3.2 oz (64.5 kg)   22 0130 -- -- -- -- 16 -- --   22 2054 -- -- -- -- 16 -- --   22 1634 -- -- -- -- 18 -- --   22 1451 131/82 98.2 °F (36.8 °C) Oral 96 16 95 % --   22 1018 -- -- -- -- 18 -- --     Const: Alert. No distress, pleasant. HEENT: Normocephalic, atraumatic. Normal sclera/conjunctiva. MMM. CV: Regular rate and rhythm. Resp: No respiratory distress. Lungs CTAB. Abd: Soft, nontender, nondistended, NABS+   Ext: No edema. MSK: decreased spine ROM  Neuro: Alert, oriented, appropriately interactive. Psych: Cooperative, appropriate mood and affect    Laboratory data: Available via EMR.    Last 24 hour lab  Recent Results (from the past 24 hour(s))   Basic Metabolic Panel    Collection Time: 22  8:01 AM   Result Value Ref Range    Sodium 137 136 - 145 mmol/L    Potassium 4.4 3.5 - 5.1 mmol/L    Chloride 98 (L) 99 - 110 mmol/L    CO2 30 21 - 32 mmol/L    Anion Gap 9 3 - 16    Glucose 107 (H) 70 - 99 mg/dL    BUN 14 7 - 20 mg/dL    Creatinine 0.8 0.6 - 1.2 mg/dL    Est, Glom Filt Rate >60 >60    Calcium 9.0 8.3 - 10.6 mg/dL   CBC    Collection Time: 22  8:01 AM   Result Value Ref Range    WBC 9.0 4.0 - 11.0 K/uL RBC 3.24 (L) 4.00 - 5.20 M/uL    Hemoglobin 9.8 (L) 12.0 - 16.0 g/dL    Hematocrit 29.2 (L) 36.0 - 48.0 %    MCV 90.0 80.0 - 100.0 fL    MCH 30.3 26.0 - 34.0 pg    MCHC 33.6 31.0 - 36.0 g/dL    RDW 14.6 12.4 - 15.4 %    Platelets 615 (H) 092 - 450 K/uL    MPV 7.1 5.0 - 10.5 fL       Therapy progress:  Physical therapy:  Bed Mobility:  Overall Assistance Level: Minimal Assistance  Sit>supine:  Assistance Level: Minimal assistance (for L LE)  Skilled Clinical Factors: verbal cues for technique -- pt asking for more assistance but able to complete with min A with encouragement  Supine>sit:  Assistance Level: Minimal assistance  Skilled Clinical Factors: HOB elevated slightly, use of R bed rail  Transfers:     Sit>stand:  Assistance Level: Stand by assist, Contact guard assist  Skilled Clinical Factors: verbal cues for hand placement  Stand>sit:  Assistance Level: Stand by assist  Skilled Clinical Factors: verbal cues for hand placement, forgets to reach back for arm rests of chair  Bed<>chair  Assistance Level: Stand by assist (with walker)  Stand Pivot:     Lateral transfer:     Car transfer:     Ambulation:  Device: Rolling walker  Assistance Level: Stand by assist, Supervision  Gait Deviations: Slow quan, Decreased step length bilateral, Decreased weight shift bilateral, Decreased arm swing bilateral, Decreased trunk rotation (flexed slightly at hips, tends to walk behind walker base)  Skilled Clinical Factors: verbal cues to keep eyes/head up and look forward  Stairs:  Stair Height: 6''  Device: Standard walker  Number of Stairs: 1  Additional Factors: Verbal cues (leading up and down with R LE)  Assistance Level: Contact guard assist  Skilled Clinical Factors: Pt able to recall sequencing for bringing walker up/down on curb step but forgot sequencing for ascending with stronger R leg and descending leading with weaker L leg  Curb:  Curb Height:  (see above for curb step)  Wheelchair: Assessment:  Activity Tolerance: Patient limited by pain, Patient limited by fatigue  Discharge Recommendations: Continue to assess pending progress, Patient would benefit from continued therapy after discharge      Occupational therapy:   Feeding  Assistance Level: Independent  Grooming/Oral Hygiene  Assistance Level: Stand by assist, Set-up  Skilled Clinical Factors: standing at sink to wash hands and brush teeth, but fatigued while brushing teeth requiring chair be brought up for seated rest break. Completed remained of brushing teeth and comb hair in seated. UE Bathing  Assistance Level: Stand by assist, Set-up  Skilled Clinical Factors: seated on shower chiar  LE Bathing  Equipment Provided: Long-handled sponge  Assistance Level: Minimal assistance  Skilled Clinical Factors: pt used LH sponge to wash LE's SBA, VC's to maintain spinal precautions. Pt stood at grab bars with wash periarea/buttocks with SBA, but needed assist to dry buttocks and backs of thighs in stance at [address] Level: Moderate assistance, Minimal assistance  Skilled Clinical Factors: doffed shirt/bra SBA, min A to don bra, mod A to don button up shirt  LE Dressing  Equipment Provided: Reachers, Sock aid, Long-handled shoe-horn  Assistance Level: Moderate assistance  Skilled Clinical Factors: to don/doff pants, underwear using A/E to maintain spinal precautions with LB dressing. Pt educated to don L LE first d/t L LE more painful, but still needed assist despite attempt at use of reacher. SBA to manage clothing up over hips. Pt administered elastic laces for shoes. Putting On/Taking Off Footwear  Equipment Provided: Sock aid  Assistance Level: Moderate assistance  Skilled Clinical Factors: socks/shoes.  Pt able to doff R sock using shoe horn and don with sock aid and set-up, but still needed assist to don/doff L sock d/t pain  Toileting  Assistance Level: Stand by assist, Contact guard assist  Skilled Clinical Factors: to void urine at commode x2 trials, SBA to manage clothing down/up at RW and for pericare  Assessment:  Activity Tolerance: Patient limited by pain, Patient limited by fatigue  Discharge Recommendations: Continue to assess pending progress, Home with assist PRN, Patient would benefit from continued therapy after discharge, Home with Home health OT    Speech therapy:            PT  Position Activity Restriction  Spinal Precautions: No Bending, No Lifting, No Twisting  Other position/activity restrictions: 2 incisions on lower back, 1 incision on lower stomach  Objective     Sit to Stand: Contact guard assistance (cues for hand placement)  Stand to Sit: Contact guard assistance (cues for hand placement)  Bed to Chair: Contact guard assistance (cues for safety with wheeled walker)  Device: Rolling Walker  Assistance: Contact guard assistance  Distance: 61' with several turns( 126' in PM)  OT  PT Equipment Recommendations  Other: has wheeled walker  Toilet - Technique: Ambulating  Equipment Used: Standard toilet  Assessment        SLP          Body mass index is 26.08 kg/m². Assessment and Plan:    Lumbar stenosis with neurogenic claudication  -s/p  L4-S1 anterior lumbar interbody fusion with pedicle screw fixation and laminectomy and decompression (11/3 with Dr. Siu Covert)  -PT/OT    HTN  -lisinopril, metoprolol    HLD  -atorvastatin    Bladder  -High risk retention  -Monitor PVRs, straight cath prn >300    Bowel  -High risk constipation  -senna+colace BID, prn miralax, MoM, bisacodyl supp    Pain control  -Norco prn    Ppx  -lovenox, ppi    Rehab Progress: Making progress. Working on functional mobility, balance, compensatory strategies. Anticipated Dispo: home with   Services/DME: TBD  ELOS: 10 days      [address] MD 11/14/2022, 9:46 AM

## 2022-11-13 NOTE — PLAN OF CARE
Problem: Discharge Planning  Goal: Discharge to home or other facility with appropriate resources  11/13/2022 1224 by Pablo Reich RN  Outcome: Progressing  Flowsheets  Taken 11/13/2022 1224  Discharge to home or other facility with appropriate resources:   Identify barriers to discharge with patient and caregiver   Arrange for needed discharge resources and transportation as appropriate   Identify discharge learning needs (meds, wound care, etc)  Taken 11/13/2022 0830  Discharge to home or other facility with appropriate resources:   Identify barriers to discharge with patient and caregiver   Arrange for needed discharge resources and transportation as appropriate   Identify discharge learning needs (meds, wound care, etc)  Note: Cues for self care, states  will assist at home. 11/13/2022 0010 by Deann Pena RN  Outcome: Progressing     Problem: Safety - Adult  Goal: Free from fall injury  11/13/2022 1224 by Pablo Reich RN  Outcome: Progressing  Flowsheets  Taken 11/13/2022 1224  Free From Fall Injury: Instruct family/caregiver on patient safety  Taken 11/13/2022 1121  Free From Fall Injury: Instruct family/caregiver on patient safety  Note: Calls for needs, continue walker safety. 11/13/2022 0010 by Deann Pena RN  Outcome: Progressing  Note: Patient free from falls this shift. Fall precautions in place at all times. Bed in lowest position with two side rails up and wheels locked. Call light within reach. Patient able and agreeable to contact for safety appropriately. Up with front wheel walker, gait belt, and assist of one; patient tolerates well.        Problem: ABCDS Injury Assessment  Goal: Absence of physical injury  11/13/2022 1224 by Pablo Reich RN  Outcome: Progressing  Flowsheets (Taken 11/13/2022 1121)  Absence of Physical Injury: Implement safety measures based on patient assessment  11/13/2022 0010 by Deann Pena RN  Outcome: Progressing  Flowsheets (Taken 11/13/2022 0007)  Absence of Physical Injury: Implement safety measures based on patient assessment     Problem: Pain  Goal: Verbalizes/displays adequate comfort level or baseline comfort level  11/13/2022 1224 by Gordo Chase RN  Outcome: Progressing  Flowsheets (Taken 11/13/2022 1224)  Verbalizes/displays adequate comfort level or baseline comfort level:   Encourage patient to monitor pain and request assistance   Assess pain using appropriate pain scale   Administer analgesics based on type and severity of pain and evaluate response   Implement non-pharmacological measures as appropriate and evaluate response  Note: Continue review of time of pain medication, declines ice to left thigh. 11/13/2022 0010 by Phu Amanda RN  Outcome: Progressing  Note: Pt able to express presence/absence of pain and rate pain appropriately using numerical scale. Pain/discomfort being managed with PRN analgesics per MD orders (see MAR). Pain assessed every shift and after interventions. Patient rated pain 7 on 1-10 pain scale to surgical sites. Medicated with Norco per prn order with good relief. Problem: Nutrition Deficit:  Goal: Optimize nutritional status  11/13/2022 1224 by Gordo Chase RN  Outcome: Progressing  Flowsheets (Taken 11/13/2022 1224)  Nutrient intake appropriate for improving, restoring, or maintaining nutritional needs:   Assess nutritional status and recommend course of action   Recommend appropriate diets, oral nutritional supplements, and vitamin/mineral supplements  Note: Intake adequate.   11/13/2022 0010 by Phu Amanda RN  Outcome: Progressing

## 2022-11-13 NOTE — PROGRESS NOTES
Patient calm, cooperative, and AAO x 4. Lungs clear, denies sob or cough. Requested prn benadryl for \"runny sinuses\". Surgical sites to abdomin and back without s/s of infection; both are open to air. Patient refused evening senokot stating she had a large BM this evening. Patient up to BR with front wheel walker, gait belt, and assist of one. Requested pain medication for surgical pain sites, medicated with prn norco per order. Fall precautions in place. Call light and personal belongings are within reach.

## 2022-11-13 NOTE — PLAN OF CARE
Problem: Safety - Adult  Goal: Free from fall injury  11/13/2022 0010 by Trinh Mauro RN  Outcome: Progressing  Note: Patient free from falls this shift. Fall precautions in place at all times. Bed in lowest position with two side rails up and wheels locked. Call light within reach. Patient able and agreeable to contact for safety appropriately. Up with front wheel walker, gait belt, and assist of one; patient tolerates well. Problem: Pain  Goal: Verbalizes/displays adequate comfort level or baseline comfort level  11/13/2022 0010 by Trinh Mauro RN  Outcome: Progressing  Note: Pt able to express presence/absence of pain and rate pain appropriately using numerical scale. Pain/discomfort being managed with PRN analgesics per MD orders (see MAR). Pain assessed every shift and after interventions. Patient rated pain 7 on 1-10 pain scale to surgical sites. Medicated with Norco per prn order with good relief.

## 2022-11-13 NOTE — PROGRESS NOTES
States wants to go home in a few says but will ask nurse to help her sit up in bed and move leg, states  will help at home.

## 2022-11-14 LAB
ANION GAP SERPL CALCULATED.3IONS-SCNC: 9 MMOL/L (ref 3–16)
BUN BLDV-MCNC: 14 MG/DL (ref 7–20)
CALCIUM SERPL-MCNC: 9 MG/DL (ref 8.3–10.6)
CHLORIDE BLD-SCNC: 98 MMOL/L (ref 99–110)
CO2: 30 MMOL/L (ref 21–32)
CREAT SERPL-MCNC: 0.8 MG/DL (ref 0.6–1.2)
GFR SERPL CREATININE-BSD FRML MDRD: >60 ML/MIN/{1.73_M2}
GLUCOSE BLD-MCNC: 107 MG/DL (ref 70–99)
HCT VFR BLD CALC: 29.2 % (ref 36–48)
HEMOGLOBIN: 9.8 G/DL (ref 12–16)
MCH RBC QN AUTO: 30.3 PG (ref 26–34)
MCHC RBC AUTO-ENTMCNC: 33.6 G/DL (ref 31–36)
MCV RBC AUTO: 90 FL (ref 80–100)
PDW BLD-RTO: 14.6 % (ref 12.4–15.4)
PLATELET # BLD: 503 K/UL (ref 135–450)
PMV BLD AUTO: 7.1 FL (ref 5–10.5)
POTASSIUM SERPL-SCNC: 4.4 MMOL/L (ref 3.5–5.1)
RBC # BLD: 3.24 M/UL (ref 4–5.2)
SODIUM BLD-SCNC: 137 MMOL/L (ref 136–145)
WBC # BLD: 9 K/UL (ref 4–11)

## 2022-11-14 PROCEDURE — 80048 BASIC METABOLIC PNL TOTAL CA: CPT

## 2022-11-14 PROCEDURE — 6370000000 HC RX 637 (ALT 250 FOR IP): Performed by: PHYSICAL MEDICINE & REHABILITATION

## 2022-11-14 PROCEDURE — 1280000000 HC REHAB R&B

## 2022-11-14 PROCEDURE — 97116 GAIT TRAINING THERAPY: CPT | Performed by: PHYSICAL THERAPIST

## 2022-11-14 PROCEDURE — 85027 COMPLETE CBC AUTOMATED: CPT

## 2022-11-14 PROCEDURE — 97535 SELF CARE MNGMENT TRAINING: CPT

## 2022-11-14 PROCEDURE — 97110 THERAPEUTIC EXERCISES: CPT | Performed by: PHYSICAL THERAPIST

## 2022-11-14 PROCEDURE — 97530 THERAPEUTIC ACTIVITIES: CPT

## 2022-11-14 PROCEDURE — 6360000002 HC RX W HCPCS: Performed by: PHYSICAL MEDICINE & REHABILITATION

## 2022-11-14 PROCEDURE — 36415 COLL VENOUS BLD VENIPUNCTURE: CPT

## 2022-11-14 PROCEDURE — 94760 N-INVAS EAR/PLS OXIMETRY 1: CPT

## 2022-11-14 PROCEDURE — 97530 THERAPEUTIC ACTIVITIES: CPT | Performed by: PHYSICAL THERAPIST

## 2022-11-14 RX ADMIN — ATORVASTATIN CALCIUM 10 MG: 10 TABLET, FILM COATED ORAL at 21:30

## 2022-11-14 RX ADMIN — Medication 1 CAPSULE: at 09:38

## 2022-11-14 RX ADMIN — HYDROCODONE BITARTRATE AND ACETAMINOPHEN 2 TABLET: 5; 325 TABLET ORAL at 05:10

## 2022-11-14 RX ADMIN — METOPROLOL SUCCINATE 25 MG: 25 TABLET, EXTENDED RELEASE ORAL at 09:39

## 2022-11-14 RX ADMIN — HYDROCODONE BITARTRATE AND ACETAMINOPHEN 2 TABLET: 5; 325 TABLET ORAL at 18:40

## 2022-11-14 RX ADMIN — HYDROCODONE BITARTRATE AND ACETAMINOPHEN 2 TABLET: 5; 325 TABLET ORAL at 09:39

## 2022-11-14 RX ADMIN — PANTOPRAZOLE SODIUM 40 MG: 40 TABLET, DELAYED RELEASE ORAL at 05:26

## 2022-11-14 RX ADMIN — Medication 400 MG: at 09:38

## 2022-11-14 RX ADMIN — POLYVINYL ALCOHOL 1 DROP: 14 SOLUTION/ DROPS OPHTHALMIC at 03:27

## 2022-11-14 RX ADMIN — HYDROCODONE BITARTRATE AND ACETAMINOPHEN 2 TABLET: 5; 325 TABLET ORAL at 01:00

## 2022-11-14 RX ADMIN — Medication 2000 UNITS: at 09:38

## 2022-11-14 RX ADMIN — Medication 1 TABLET: at 09:38

## 2022-11-14 RX ADMIN — POLYVINYL ALCOHOL 1 DROP: 14 SOLUTION/ DROPS OPHTHALMIC at 09:39

## 2022-11-14 RX ADMIN — ENOXAPARIN SODIUM 40 MG: 100 INJECTION SUBCUTANEOUS at 21:29

## 2022-11-14 RX ADMIN — LISINOPRIL 40 MG: 40 TABLET ORAL at 09:39

## 2022-11-14 RX ADMIN — DIAZEPAM 5 MG: 5 TABLET ORAL at 03:27

## 2022-11-14 RX ADMIN — Medication 400 MG: at 21:30

## 2022-11-14 RX ADMIN — DIAZEPAM 5 MG: 5 TABLET ORAL at 12:10

## 2022-11-14 RX ADMIN — ONDANSETRON HYDROCHLORIDE 4 MG: 4 TABLET, FILM COATED ORAL at 12:10

## 2022-11-14 RX ADMIN — HYDROCODONE BITARTRATE AND ACETAMINOPHEN 2 TABLET: 5; 325 TABLET ORAL at 14:38

## 2022-11-14 RX ADMIN — POLYVINYL ALCOHOL 1 DROP: 14 SOLUTION/ DROPS OPHTHALMIC at 21:30

## 2022-11-14 ASSESSMENT — PAIN SCALES - GENERAL
PAINLEVEL_OUTOF10: 7
PAINLEVEL_OUTOF10: 8
PAINLEVEL_OUTOF10: 7
PAINLEVEL_OUTOF10: 0
PAINLEVEL_OUTOF10: 7

## 2022-11-14 ASSESSMENT — PAIN DESCRIPTION - FREQUENCY
FREQUENCY: CONTINUOUS

## 2022-11-14 ASSESSMENT — 9 HOLE PEG TEST
TEST_RESULT: IMPAIRED
TESTTIME_SECONDS: 46.39
TEST_RESULT: IMPAIRED
TESTTIME_SECONDS: 59.78

## 2022-11-14 ASSESSMENT — PAIN DESCRIPTION - PAIN TYPE
TYPE: ACUTE PAIN

## 2022-11-14 ASSESSMENT — PAIN - FUNCTIONAL ASSESSMENT
PAIN_FUNCTIONAL_ASSESSMENT: ACTIVITIES ARE NOT PREVENTED

## 2022-11-14 ASSESSMENT — PAIN DESCRIPTION - LOCATION
LOCATION: BACK

## 2022-11-14 ASSESSMENT — PAIN DESCRIPTION - DESCRIPTORS
DESCRIPTORS: DULL;THROBBING

## 2022-11-14 ASSESSMENT — PAIN DESCRIPTION - DIRECTION: RADIATING_TOWARDS: LEFT THIGH

## 2022-11-14 ASSESSMENT — PAIN DESCRIPTION - ONSET
ONSET: ON-GOING

## 2022-11-14 ASSESSMENT — PAIN DESCRIPTION - ORIENTATION
ORIENTATION: LOWER

## 2022-11-14 NOTE — PROGRESS NOTES
Physical Therapy  Facility/Department: [address] [facility]  Rehabilitation Physical Therapy Treatment Note  AM and PM    NAME: Crista Krueger  : 1950 (67 y.o.)  MRN: [id number]  CODE STATUS: Full Code    Date of Service: 22       Restrictions:  Restrictions/Precautions: General Precautions; Fall Risk  Position Activity Restriction  Spinal Precautions: No Bending; No Lifting; No Twisting  Other position/activity restrictions: 2 incisions on lower back, 1 incision on lower stomach   Pertinent medical information:  Additional Pertinent Hx: Per Dr. Alvin Lam H&P, \"Patient is a 49-year-old female with a history of severe lumbar spinal stenosis causing low back and leg pain complaints that does not improve with conservative treatment. Patient was taken to surgery on 11/3 by Dr. Diony Terry at [facility] where she underwent L4-S1 anterior lumbar interbody fusion with pedicle screw fixation and laminectomy and decompression procedure. Postoperatively patient was started therapies, but needs more therapy before discharge to home safely. Patient also has history of hypertension and hyperlipidemia. Patient lives at home with her  in a 1 - level house. \"   SUBJECTIVE  Subjective  Subjective: Patient reports pain 5/10 which is better in LB. Patient agreeable to therapy. Pain: 5/10     OBJECTIVE  Cognition  Safety Judgement: Decreased awareness of need for safety (reminders for spinal precautions and hand placement with fxl transfers)  Problem Solving: Decreased awareness of errors  Orientation  Overall Orientation Status: Within Normal Limits    Functional Mobility  Bed Mobility  Overall Assistance Level: Stand By Assist  Additional Factors: Increased time to complete; Head of bed flat; Without handrails  Bridging  Assistance Level: Contact guard assist;Stand by assist  Roll Left  Assistance Level: Stand by assist  Roll Right  Assistance Level: Stand by assist  Skilled Clinical Factors: very painful cues for log roll  Sit to Supine  Assistance Level: Stand by assist (for L LE)  Skilled Clinical Factors: no assist for LEs, slow due to pain  Supine to Sit  Assistance Level: Minimal assistance  Skilled Clinical Factors: HOB elevated slightly, use of R bed rail  Scooting  Assistance Level: Stand by assist;Contact guard assist  Balance  Sitting Balance: Independent (midline and upright)  Standing Balance: Stand by assistance  Standing Balance  Activity: stood with wheeled walker SBA  Comments: steady  Transfers  Surface: To chair with arms;From chair with arms;From bed; To bed  Additional Factors: Verbal cues; Hand placement cues; Increased time to complete  Sit to Stand  Assistance Level: Stand by assist  Skilled Clinical Factors: verbal cues for hand placement  Stand to Sit  Assistance Level: Stand by assist  Skilled Clinical Factors: verbal cues for hand placement, forgets to reach back for arm rests of chair  Bed To/From Chair  Technique: Stand pivot  Assistance Level: Stand by assist (with walker)      Environmental Mobility  Ambulation  Device: Rolling walker  Distance: 220' x 2  Activity: Within Unit  Activity Comments: lesss pain L LE  Additional Factors: Hand placement cues; Verbal cues; Increased time to complete  Assistance Level: Stand by assist;Supervision  Gait Deviations: Slow quan;Decreased step length bilateral;Decreased weight shift bilateral;Decreased arm swing bilateral;Decreased trunk rotation (flexed slightly at hips, tends to walk behind walker base)  Skilled Clinical Factors: verbal cues to keep eyes/head up and look forward  Stairs  Number of Stairs: 1  Additional Factors:  (leading up and down with R LE)  Curb  Curb Height: 6'' (see above for curb step)  Device: Rolling walker  Number of Curbs: 1  Additional Factors: Verbal cues; Non-reciprocal going up;Non-reciprocal going down  Assistance Level: Contact guard assist;Minimal assistance  Skilled Clinical Factors: slight management wheeled walker descend, patient did lead L LE with no buckling, advised lead with R LE first    PT Exercises  Exercise Equipment: SEATED: AP x 10 reps, seated L knee flexion x 20( improved with ROM), LAQ x 20. adductor sets x 20      ASSESSMENT/PROGRESS TOWARDS GOALS       Assessment  Assessment: Patient is a 68 y/o female who had surgery on 11/3 by Dr. Gregory Narayanan at [facility] where she underwent L4-S1 anterior lumbar interbody fusion with pedicle screw fixation and laminectomy and decompression procedure. Patient transfer to ARU on 11/10. Patient was independent with no device and lives with spouse. Patient has 1 step to enter home. Patient required SBA for transfers with cus for wheeled walker safety and able to ambulate with wheeled walker community distances with SBA, guarded and slow movements. Patient able to perform bed mobility with SBA with significant improvement. . patient limited due to pain , weakness primary L LE and nausea. Patient would benefit from ARU to improve overall strength, balance, improve pain management  in order to achieve functional mobility to independence to return home. Anticipate D/C towards end of week. Activity Tolerance: Patient limited by pain; Patient limited by fatigue;Patient tolerated treatment well  Discharge Recommendations: Continue to assess pending progress; Patient would benefit from continued therapy after discharge;Home with Home health PT;Home with assist PRN  PT Equipment Recommendations  Equipment Needed: No  Other: has wheeled walker    Goals  Patient Goals   Patient Goals : Patient's goal is to go home and take care of herself  Short Term Goals  Time Frame for Short Term Goals: 7-10 days  Short Term Goal 1: transfer with MI  Short Term Goal 2: Bed mobility with MI  Short Term Goal 3: ambulate with wheeled walker 150' with MI  Short Term Goal 4: ascend/descend curb step with wheeled walker with SBA  Long Term Goals  Time Frame for Long Term Goals : STG= LTG    PLAN OF CARE/SAFETY  Physcial Therapy Plan  General Plan:  minutes of therapy at least 5 out of 7 days a week  Days Per Week: 5 Days  Hours Per Day: 1.5 hours  Therapy Duration: 10 Days (7-10 days)  Specific Instructions for Next Treatment: curb step assess  Current Treatment Recommendations: Strengthening;Balance training;Functional mobility training;Transfer training;ADL/Self-care training; Endurance training;Gait training;Stair training;Pain management;Home exercise program;Safety education & training;Patient/Caregiver education & training;Equipment evaluation, education, & procurement;Positioning; Therapeutic activities  Safety Devices  Type of Devices: Patient at risk for falls; All fall risk precautions in place;Gait belt;Left in chair (transport to room)    EDUCATION  Education  Education Given To: Patient  Education Provided: Role of Therapy;Plan of Care;Precautions; Safety;ADL Function;Mobility Training;Transfer Training;Home Exercise Program;Family Education;Equipment  Education Provided Comments: log roll, curb step  Education Method: Demonstration;Verbal;Teach Back  Barriers to Learning: None  Education Outcome: Verbalized understanding;Continued education needed;Demonstrated understanding  Second session  S/ Patient reports 8/10 pain, states did call someone about need for pain medicine. Nurse, Shelbi Muller, brought pain medication to patient. Patient agreeable to therapy. O/ sit to stand SBA, ambulated with wheeled walker SBA, 150' x 2- slow quan and guarded, cues to stay in frame of wheeled walker. Standing exercises, toe raises/heelrocks x 10, marches x 20, rest, mini squats x 10 with UE support  Sitting marches x 20, LAQ x 10, adductor sets x 15  Second Session- Patient with increase pain this PM with increase in guarding with functional mobility. Patient continues with SBA with cues occasionally for direction, forgetful.   Therapy Time   Individual Concurrent Group Co-treatment   Time In 0945         Time Out 1030         Minutes 45           Timed Code Treatment Minutes: 39 Minutes   Second Session Therapy Time     Individual Co-treatment   Time In [address] [place] [address], PT, 11/14/22 at 12:34 PM

## 2022-11-14 NOTE — PROGRESS NOTES
Patient admitted to rehab with fusion of spine of lumbar region. A/Ox4. Transfers with walker x1. Mobility restrictions: walker x1. On regular diet, tolerating well. Medications taken whole with thins. On Lovenox for DVT prophylaxis. Skin: surgical incisions x2 to lower back; surgical incision to abdomen. Oxygen: RA. LDA: none. Has been continent of bowel and continent of bladder. LBM 11/14/22. Chair/bed alarms in use and call light in reach. Will monitor for safety.  Electronically signed by Matias Noonan RN on 11/14/2022 at 1:25 PM

## 2022-11-14 NOTE — PLAN OF CARE
Problem: Safety - Adult  Goal: Free from fall injury  Outcome: Progressing  Note: Patient free from falls this shift. Fall precautions in place at all times. Bed in lowest position with two side rails up and wheels locked. Call light within reach. Patient able and agreeable to contact for safety appropriately. Up with front wheel walker, gait belt, and assist of one; patient tolerates well. Problem: Pain  Goal: Verbalizes/displays adequate comfort level or baseline comfort level  Outcome: Progressing  Note: Pt able to express presence/absence of pain and rate pain appropriately using numerical scale. Pain/discomfort being managed with PRN analgesics per MD orders (see MAR). Pain assessed every shift and after interventions. Patient rated pain 7 on 1-10 pain scale to surgical sites. Medicated with Norco per prn order with good relief.

## 2022-11-14 NOTE — PLAN OF CARE
Problem: Discharge Planning  Goal: Discharge to home or other facility with appropriate resources  11/14/2022 1314 by Asa Broussard RN  Outcome: Progressing  Flowsheets (Taken 11/14/2022 0930)  Discharge to home or other facility with appropriate resources:   Identify barriers to discharge with patient and caregiver   Arrange for needed discharge resources and transportation as appropriate   Identify discharge learning needs (meds, wound care, etc)   Refer to discharge planning if patient needs post-hospital services based on physician order or complex needs related to functional status, cognitive ability or social support system     Problem: Safety - Adult  Goal: Free from fall injury  11/14/2022 1314 by Asa Broussard RN  Outcome: Progressing  Flowsheets (Taken 11/14/2022 1312)  Free From Fall Injury: Instruct family/caregiver on patient safety     Problem: ABCDS Injury Assessment  Goal: Absence of physical injury  11/14/2022 1314 by Asa Broussard RN  Outcome: Progressing  Flowsheets (Taken 11/14/2022 1312)  Absence of Physical Injury: Implement safety measures based on patient assessment     Problem: Pain  Goal: Verbalizes/displays adequate comfort level or baseline comfort level  11/14/2022 1314 by Asa Broussard RN  Outcome: Progressing  Flowsheets (Taken 11/14/2022 0930)  Verbalizes/displays adequate comfort level or baseline comfort level:   Encourage patient to monitor pain and request assistance   Assess pain using appropriate pain scale   Administer analgesics based on type and severity of pain and evaluate response   Implement non-pharmacological measures as appropriate and evaluate response     Problem: Nutrition Deficit:  Goal: Optimize nutritional status  11/14/2022 1314 by Asa Broussard RN  Outcome: Progressing

## 2022-11-14 NOTE — PROGRESS NOTES
Occupational Therapy  Facility/Department: [facility]  Rehabilitation Occupational Therapy Daily Treatment Note    Date: 22  Patient Name: Ivone Crain       Room: E7T-6224/8696-00  MRN: [id number]  Account: [de-identified]   : 1950  (73 y.o.) Gender: female                    Past Medical History:  has a past medical history of Hypertension and PONV (postoperative nausea and vomiting). Past Surgical History:   has a past surgical history that includes Tonsillectomy; Tubal ligation; and cyst removal.    Restrictions  Restrictions/Precautions: General Precautions; Fall Risk  Other position/activity restrictions: 2 incisions on lower back, 1 incision on lower stomach    Subjective  Subjective: pt met in dept, agreeable to OT this AM  Restrictions/Precautions: General Precautions; Fall Risk             Objective     Cognition  Safety Judgement: Decreased awareness of need for safety (reminders for spinal precautions and hand placement with fxl transfers)  Problem Solving: Decreased awareness of errors  Orientation  Overall Orientation Status: Within Normal Limits         ADL  Putting On/Taking Off Footwear  Equipment Provided: Reachers;Sock aid (long shoehorn)  Assistance Level: Moderate assistance  Skilled Clinical Factors: pt used reacher to [employer] socks, educated on using sock aid to don CHANA hose, required assist to thread B CHANA hose onto sock aid, able to thread foot through sock aid, pull over heel, but required assist to pull CHANA hose up calves, assist to remove sock aid, pt with increased difficulty as sock aid has foam piece making it more difficult to thread and remove hose from sock aid, able to use long shoe horn to don B shoes with assist to adjust the tongue of shoe.  will cont education with AE to increase independence in dressing tasks    Hand Assessment   Hand Dominance     Hand Dominance Right   Left Hand Strength -  (lbs)     Handle Setting 1 --   Handle Setting 2 33.33 lbs be able to return home with assist from spouse PRN, home OT. Anticipate pt will need long shoe horn, shower seat with back, cont to assess DME needs. Activity Tolerance: Patient limited by pain; Patient limited by fatigue;Patient tolerated treatment well  Discharge Recommendations: Continue to assess pending progress;Home with assist PRN;Patient would benefit from continued therapy after discharge;Home with Home health OT  OT Equipment Recommendations  Equipment Needed: Yes  Mobility Devices: ADL Assistive Devices  ADL Assistive Devices: Long-handled Shoe Horn;Shower Chair with back  Other: Cont to assess, pt has RW, reacher, long sponge, sock aid  Safety Devices  Safety Devices in place: Yes  Type of devices: All fall risk precautions in place;Gait belt (pt to room with transport at end of session)    PM Session     Pt met in dept, agreeable to OT this PM.     Discussed home equipment and discharge with pt, pt would like to go home on Thursday or Friday of this week, stated her  is retired and able to assist her at home as needed. Anticipate pt would benefit from TSF, grab bars, shower chair with back, and half bed rail at discharge to complete ADLs while maintaining spinal precautions, pt agreeable on need for DME. Completed sit stand from wheelchair to RW with SBA, amb into bathroom with SBA, completing toilet transfer on comfort height commode with TSF with SBA. Pt voided urine, able to manage clothing and complete toilet hygiene with SBA. Amb to sink to wash hands after toileting, requiring verbal cues to find soap (attempting to wash hands with bleach wipe). Pt reports  and sister in law purchased a toilet riser, unable to determine type or height of rise purchased as patient herself was unsure. Educated pt on safety when using risers, to avoid risers with arm rests as they tip easily, pt with good understanding and stated the one they purchased did not have arm rests.      Pt completing tub transfer, using grab bar and wall to support self while side stepping into the tub with SBA, increased time to complete, sitting in shower chair with back once in tub. Pt completing bed mobility (sit to supine, supine to sit) using half bed rail with supervision, able to maintain spinal precautions using a log roll to transfer from her side to her back. Discussed use of blanket while completing log roll, educated pt on laying flat on her back while retrieving blanket and using the reacher as needed, then rolling on her side once the blanket was in place. Transport back to room with SOT and OT at end of session. Pt transferring to bed with RW and SBA. Required min assist to manage LLE into the bed when laying supine. Discussed getting in on the opposite side of the bed to increase pt independence, pt with good understanding stating \"I'll try that next time. \"    Pt left in bed at end of session, bed alarm on, call light in place, friend \"Aileen\" present in the room. Patient Education  Education  Education Given To: Patient  Education Provided: ADL Function;Equipment; Safety;Precautions; Home Exercise Program  Education Method: Demonstration;Verbal;Teach Back  Barriers to Learning: None  Education Outcome: Verbalized understanding;Demonstrated understanding;Continued education needed    Plan  Occupational Therapy Plan  Times Per Week: 5-6  Times Per Day: Twice a day  Days Per Week: 5 Days  Hours Per Day: 1.5 hours  Therapy Duration: 10 Days (7-10 Days)  Current Treatment Recommendations: Strengthening;Balance training;Functional mobility training; Endurance training;Pain management; Safety education & training;Patient/Caregiver education & training;Equipment evaluation, education, & procurement;Self-Care / ADL; Home management training    Goals  Patient Goals   Patient goals : pt stated she wants to start feeling better  Short Term Goals  Time Frame for Short Term Goals: in 7-10 days pt will be. ..   Short Term Goal 1: indep with toileting using grab bars PRN  Short Term Goal 2: indep with bathing using AE to maintain spinal precautions  Short Term Goal 3: indep with UB dressing  Short Term Goal 4: indep with LB dressing (exception of CHANA hose) using AE to maintain spinal precautions  Short Term Goal 5: indep with functional mobility and transfers using LRAD while maintaining spinal precautions  Additional Goals?: Yes  Short Term Goal 6: indep with IADL tasks using LRAD and maintaining spinal precautions              Therapy Time   Individual Concurrent Group Co-treatment   Time In 1115         Time Out 1200         Minutes 45             Therapy Time     Individual Co-treatment   Time In 1515     Time Out 1600     Minutes 178 Max , S/OT    Therapist was present, directed the patient's care, made skilled judgement, and was responsible for assessment and treatment of the patient.

## 2022-11-14 NOTE — PATIENT CARE CONFERENCE
[facility]  Inpatient Rehabilitation  Weekly Team Conference Note      Date: 11/15/2022  Patient Name:  Era Peoples    MRN: [id number]  : 1950  Gender: Female   Physician: Dr. Genie Mayes   Diagnosis: Fusion of spine of lumbar region [M43.26]    CASE MANAGEMENT  Assessment: goal is home with spouse. PHYSICAL THERAPY      Bed Mobility:  Overall Assistance Level: Stand By Assist  Additional Factors: Increased time to complete, Head of bed flat, Without handrails  Sit>supine:  Assistance Level: Stand by assist (for L LE)  Skilled Clinical Factors: no assist for LEs, slow due to pain  Supine>sit:  Assistance Level: Minimal assistance  Skilled Clinical Factors: HOB elevated slightly, use of R bed rail    Transfers:  Surface:  To chair with arms, From chair with arms, From bed, To bed  Additional Factors: Verbal cues, Hand placement cues, Increased time to complete  Sit>stand:  Assistance Level: Stand by assist  Skilled Clinical Factors: verbal cues for hand placement  Stand>sit:  Assistance Level: Stand by assist  Skilled Clinical Factors: verbal cues for hand placement, forgets to reach back for arm rests of chair  Bed<>chair  Technique: Stand pivot  Assistance Level: Stand by assist (with walker)       Ambulation:  Device: Rolling walker  Distance: 220' x 2  Activity: Within Unit  Activity Comments: lesss pain L LE  Additional Factors: Hand placement cues, Verbal cues, Increased time to complete  Assistance Level: Stand by assist, Supervision  Gait Deviations: Slow quan, Decreased step length bilateral, Decreased weight shift bilateral, Decreased arm swing bilateral, Decreased trunk rotation (flexed slightly at hips, tends to walk behind walker base)  Skilled Clinical Factors: verbal cues to keep eyes/head up and look forward    Stairs:  Stair Height: 6''  Device: Standard walker  Number of Stairs: 1  Additional Factors:  (leading up and down with R LE)  Assistance Level: Contact guard assist  Skilled Clinical Factors: Pt able to recall sequencing for bringing walker up/down on curb step but forgot sequencing for ascending with stronger R leg and descending leading with weaker L leg    Curb:  Curb Height: 6'' (see above for curb step)  Device: Rolling walker  Number of Curbs: 1  Additional Factors: Verbal cues, Non-reciprocal going up, Non-reciprocal going down  Assistance Level: Contact guard assist, Minimal assistance  Skilled Clinical Factors: slight management wheeled walker descend, patient did lead L LE with no buckling, advised lead with R LE first      Assessment:  Assessment: Patient is a 66 y/o female who had surgery on 11/3 by Dr. Pooja Vee at [facility] where she underwent L4-S1 anterior lumbar interbody fusion with pedicle screw fixation and laminectomy and decompression procedure. Patient transfer to ARU on 11/10. Patient was independent with no device and lives with spouse. Patient has 1 step to enter home. Patient required SBA for transfers with cus for wheeled walker safety and able to ambulate with wheeled walker community distances with SBA, guarded and slow movements. Patient able to perform bed mobility with SBA with significant improvement. . patient limited due to pain , weakness primary L LE and nausea. Patient would benefit from ARU to improve overall strength, balance, improve pain management  in order to achieve functional mobility to independence to return home. Anticipate D/C towards end of week. Activity Tolerance: Patient limited by pain, Patient limited by fatigue, Patient tolerated treatment well  Discharge Recommendations: Continue to assess pending progress, Patient would benefit from continued therapy after discharge, Home with Home health PT, Home with assist PRN     SPEECH THERAPY (intentionally left blank if not actively being seen by this service):      OCCUPATIONAL THERAPY  ADLs:  Feeding  Assistance Level:  Independent  Grooming/Oral Hygiene  Assistance Level: Stand by assist, Set-up  Skilled Clinical Factors: standing at sink to wash hands and brush teeth, but fatigued while brushing teeth requiring chair be brought up for seated rest break. Completed remained of brushing teeth and comb hair in seated. UE Bathing  Assistance Level: Stand by assist, Set-up  Skilled Clinical Factors: seated on shower chiar  LE Bathing  Equipment Provided: Long-handled sponge  Assistance Level: Minimal assistance  Skilled Clinical Factors: pt used LH sponge to wash LE's SBA, VC's to maintain spinal precautions. Pt stood at grab bars with wash periarea/buttocks with SBA, but needed assist to dry buttocks and backs of thighs in stance at [address] Level: Moderate assistance, Minimal assistance  Skilled Clinical Factors: doffed shirt/bra SBA, min A to don bra, mod A to don button up shirt  LE Dressing  Equipment Provided: Reachers, Sock aid, Long-handled shoe-horn  Assistance Level: Moderate assistance  Skilled Clinical Factors: to don/doff pants, underwear using A/E to maintain spinal precautions with LB dressing. Pt educated to don L LE first d/t L LE more painful, but still needed assist despite attempt at use of reacher. SBA to manage clothing up over hips. Pt administered elastic laces for shoes. Putting On/Taking Off Footwear  Equipment Provided: Sock aid  Assistance Level: Moderate assistance  Skilled Clinical Factors: socks/shoes. Pt able to doff R sock using shoe horn and don with sock aid and set-up, but still needed assist to don/doff L sock d/t pain  Toileting  Assistance Level: Stand by assist, Contact guard assist  Skilled Clinical Factors: to void urine at commode x2 trials, SBA to manage clothing down/up at [facility] and for pericare    Transfers: Toilet Transfers  Equipment: Standard toilet  Additional Factors:  With handrails  Assistance Level: Stand by assist  Skilled Clinical Factors: VC's to keep RW with her throughout transfer as pt tends to leave RW off to side  Tub/Shower Transfers  Type: Shower  Transfer From: Rolling walker  Transfer To: Shower chair with back  Additional Factors: Cues for hand placement, With handrails  Assistance Level: Stand by assist, Contact guard assist        UE function:  WFL    Assessment:  Pt was able to complete functional mobility and transfers with a RW and CGA, frequent cueing for maintaining spinal precautions and safe use of RW. Pt was able to complete UB bathing with SBA/set up, LB bathing with min assist, UB dressing with min/mod assist and required mod assist for LB dressing. Pt is currently functioning below baseline and is limited by her activity tolerance, nausea, weakness, LE pain. Pt requires frequent reminders of her spinal precautions. Anticipate pt will require 7-10 days skilled IPR services to decrease caregiver burden prior to discharge. Anticipate pt will discharge home with assist from her  and a friend PRN, home OT. Pt has a reacher, sock aid, and long sponge from [employer], owns a shower stool at home, family purchased toilet riser. Anticipate pt will require a shower seat with back, long shoe horn, TSF, grab bars in shower, half bed rail. Pt would like to go home on Thursday or Friday of this week. Activity Tolerance: Patient limited by pain, Patient limited by fatigue, Patient tolerated treatment well  Discharge Recommendations: Continue to assess pending progress, Home with assist PRN, Patient would benefit from continued therapy after discharge, Home with Home health OT      NUTRITION  Most recent weightWeight: 138 lb 0.1 oz (62.6 kg)  BSA (Calculated - sq m): 1.65 sq meters  BMI (Calculated): 26.1  Please see nutrition note for details. NURSING  Pt is continent of bowel and bladder, monitor and maintain skin integrity, incision care, Fall risk.   Family Education: Patient Education:Medications, incision care, safety and fall prevention, skin care and prevention. TEAM SUMMARY AND DISCHARGE PLAN  Estimated Length of Stay:11/18/2022  Destination: home health  Anticipated Services at Discharge:    [x] OT  [x] PT   [] SLP    [x] RN   [] Home Health aide []   Community Resources: _______________________________  Equipment recommendations:  [] Hospital bed [] Tub bench  [x] Shower chair [] Hand held shower  [] Raised toilet seat [x] Toilet safety frame [] Bedside commode   [] W/C: _____  [] Rolling Walker [] Standard walker [] Gait belt [] cane: _________  [] Sliding board [] Alternate seating/furniture [] O2 [] Hip Kit: _______  [] Life Line [] Other: long shoe horn,grab bars for shower. _______  Factors facilitating achievement of predicted outcomes: Family support  Barriers to the achievement of predicted outcomes/Interventions: decreased activity tolerance, nausea, pain, forgetful spinal precautions. Interdisciplinary Individualized Plan of Care Review:    Continue Current Plan of Care: Yes    Modifications:_____________________________    Special Needs in the Upcoming Week :    [] Family/Caregiver Education  [] Home visit  []Therapeutic Pass   [] Consults:_______    [] Other;_______    Patient Rehab Team Goals for the Upcoming Week:  1. Pt will be indep with toileting and bathing using AE PRN across all disciplines. 2. MI community distances with wheeled walker  3. MI transfers          Team Members Present at Conference:  Physician:Dr. Serafin Agustin MD  : Saul Buck [place]    Occupational Therapist: Harrison Gordon, S/OT, Keyanna Rankin, OTR/L  #[id number] 11/14/22 10:56 AM   Physical Therapist: Mynor Madsen, [id number]  Speech Therapist:   ARU Supervisor: Evie Hook RN CRRN  Dietician:  Psychologist:  Other:      I led this team conference and I approve the established interdisciplinary plan of care as documented within the medical record of [facility]. MD: Amadeo Kamara.  Serafin Agustin MD 11/15/2022, 2:08 PM

## 2022-11-15 PROCEDURE — 97116 GAIT TRAINING THERAPY: CPT

## 2022-11-15 PROCEDURE — 6370000000 HC RX 637 (ALT 250 FOR IP): Performed by: PHYSICAL MEDICINE & REHABILITATION

## 2022-11-15 PROCEDURE — 97110 THERAPEUTIC EXERCISES: CPT

## 2022-11-15 PROCEDURE — 1280000000 HC REHAB R&B

## 2022-11-15 PROCEDURE — 6360000002 HC RX W HCPCS: Performed by: PHYSICAL MEDICINE & REHABILITATION

## 2022-11-15 PROCEDURE — 97535 SELF CARE MNGMENT TRAINING: CPT

## 2022-11-15 PROCEDURE — 97530 THERAPEUTIC ACTIVITIES: CPT

## 2022-11-15 RX ORDER — LOPERAMIDE HYDROCHLORIDE 2 MG/1
2 CAPSULE ORAL 3 TIMES DAILY PRN
Status: DISCONTINUED | OUTPATIENT
Start: 2022-11-15 | End: 2022-11-18 | Stop reason: HOSPADM

## 2022-11-15 RX ORDER — POLYETHYLENE GLYCOL 3350 17 G/17G
17 POWDER, FOR SOLUTION ORAL DAILY PRN
Status: DISCONTINUED | OUTPATIENT
Start: 2022-11-15 | End: 2022-11-18 | Stop reason: HOSPADM

## 2022-11-15 RX ADMIN — PANTOPRAZOLE SODIUM 40 MG: 40 TABLET, DELAYED RELEASE ORAL at 05:11

## 2022-11-15 RX ADMIN — HYDROCODONE BITARTRATE AND ACETAMINOPHEN 2 TABLET: 5; 325 TABLET ORAL at 14:52

## 2022-11-15 RX ADMIN — Medication 1 CAPSULE: at 08:13

## 2022-11-15 RX ADMIN — LISINOPRIL 40 MG: 40 TABLET ORAL at 08:13

## 2022-11-15 RX ADMIN — DIPHENHYDRAMINE HCL 25 MG: 25 TABLET ORAL at 21:07

## 2022-11-15 RX ADMIN — ATORVASTATIN CALCIUM 10 MG: 10 TABLET, FILM COATED ORAL at 21:02

## 2022-11-15 RX ADMIN — METOPROLOL SUCCINATE 25 MG: 25 TABLET, EXTENDED RELEASE ORAL at 08:13

## 2022-11-15 RX ADMIN — Medication 1 TABLET: at 08:13

## 2022-11-15 RX ADMIN — Medication 2000 UNITS: at 08:14

## 2022-11-15 RX ADMIN — HYDROCODONE BITARTRATE AND ACETAMINOPHEN 2 TABLET: 5; 325 TABLET ORAL at 07:45

## 2022-11-15 RX ADMIN — DIAZEPAM 5 MG: 5 TABLET ORAL at 13:00

## 2022-11-15 RX ADMIN — DIAZEPAM 5 MG: 5 TABLET ORAL at 05:11

## 2022-11-15 RX ADMIN — Medication 400 MG: at 08:13

## 2022-11-15 RX ADMIN — Medication 400 MG: at 21:02

## 2022-11-15 RX ADMIN — ONDANSETRON HYDROCHLORIDE 4 MG: 4 TABLET, FILM COATED ORAL at 07:45

## 2022-11-15 RX ADMIN — ENOXAPARIN SODIUM 40 MG: 100 INJECTION SUBCUTANEOUS at 21:01

## 2022-11-15 RX ADMIN — POLYVINYL ALCOHOL 1 DROP: 14 SOLUTION/ DROPS OPHTHALMIC at 08:14

## 2022-11-15 RX ADMIN — POLYVINYL ALCOHOL 1 DROP: 14 SOLUTION/ DROPS OPHTHALMIC at 21:03

## 2022-11-15 ASSESSMENT — PAIN DESCRIPTION - ONSET
ONSET: ON-GOING
ONSET: ON-GOING

## 2022-11-15 ASSESSMENT — PAIN DESCRIPTION - FREQUENCY
FREQUENCY: CONTINUOUS
FREQUENCY: CONTINUOUS

## 2022-11-15 ASSESSMENT — PAIN DESCRIPTION - PAIN TYPE
TYPE: ACUTE PAIN
TYPE: ACUTE PAIN

## 2022-11-15 ASSESSMENT — PAIN SCALES - GENERAL
PAINLEVEL_OUTOF10: 5
PAINLEVEL_OUTOF10: 5
PAINLEVEL_OUTOF10: 7
PAINLEVEL_OUTOF10: 7

## 2022-11-15 ASSESSMENT — PAIN DESCRIPTION - LOCATION
LOCATION: LEG
LOCATION: LEG

## 2022-11-15 ASSESSMENT — PAIN DESCRIPTION - DESCRIPTORS
DESCRIPTORS: ACHING
DESCRIPTORS: ACHING

## 2022-11-15 ASSESSMENT — PAIN DESCRIPTION - ORIENTATION
ORIENTATION: LEFT
ORIENTATION: LEFT

## 2022-11-15 NOTE — PLAN OF CARE
Problem: Discharge Planning  Goal: Discharge to home or other facility with appropriate resources  11/15/2022 1221 by Yoana Arita RN  Outcome: Progressing  Flowsheets (Taken 11/15/2022 0730)  Discharge to home or other facility with appropriate resources:   Identify barriers to discharge with patient and caregiver   Arrange for needed discharge resources and transportation as appropriate   Identify discharge learning needs (meds, wound care, etc)   Refer to discharge planning if patient needs post-hospital services based on physician order or complex needs related to functional status, cognitive ability or social support system     Problem: Safety - Adult  Goal: Free from fall injury  11/15/2022 1221 by Yoana Arita RN  Outcome: Progressing  Flowsheets (Taken 11/15/2022 1219)  Free From Fall Injury: Instruct family/caregiver on patient safety     Problem: ABCDS Injury Assessment  Goal: Absence of physical injury  11/15/2022 1221 by Yoana Arita RN  Outcome: Progressing  Flowsheets (Taken 11/15/2022 1219)  Absence of Physical Injury: Implement safety measures based on patient assessment     Problem: Pain  Goal: Verbalizes/displays adequate comfort level or baseline comfort level  11/15/2022 1221 by Yoana Arita RN  Outcome: Progressing  Flowsheets (Taken 11/15/2022 0730)  Verbalizes/displays adequate comfort level or baseline comfort level:   Encourage patient to monitor pain and request assistance   Assess pain using appropriate pain scale   Administer analgesics based on type and severity of pain and evaluate response   Implement non-pharmacological measures as appropriate and evaluate response     Problem: Nutrition Deficit:  Goal: Optimize nutritional status  11/15/2022 1221 by Yoana Arita RN  Outcome: Progressing

## 2022-11-15 NOTE — PLAN OF CARE
Problem: Discharge Planning  Goal: Discharge to home or other facility with appropriate resources  11/15/2022 0035 by Sage Fischer RN  Outcome: Progressing     Problem: Safety - Adult  Goal: Free from fall injury  11/15/2022 0035 by Sage Fischer RN  Outcome: Progressing     Problem: ABCDS Injury Assessment  Goal: Absence of physical injury  11/15/2022 0035 by Sage Fischer RN  Outcome: Progressing     Problem: Pain  Goal: Verbalizes/displays adequate comfort level or baseline comfort level  11/15/2022 0035 by Sage Fischer RN  Outcome: Progressing    Problem: Nutrition Deficit:  Goal: Optimize nutritional status  11/15/2022 0035 by Sage Fischer RN  Outcome: Progressing

## 2022-11-15 NOTE — PROGRESS NOTES
Patient admitted to rehab with fusion of spine of lumbar region. A/Ox4. Transfers with walker x1. Mobility restrictions: LLE weakness. On regular diet, tolerating well. Medications taken whole with thins. On Lovenox for DVT prophylaxis. Skin: surgical incisions to lower back; surgical incision to abdomen. Oxygen: RA. LDA: none. Has been continent of bowel and continent of bladder. LBM 11/15/22. Chair/bed alarms in use and call light in reach. Will monitor for safety.  Electronically signed by Dale Menjivar RN on 11/15/2022 at 12:23 PM

## 2022-11-15 NOTE — PROGRESS NOTES
PHYSICAL THERAPY  Progress Note   Second Session    Patient Name: Era Peoples  Medical Record Number: [id number]    Treatment Diagnosis: decreased functional mobility        Restrictions/Precautions: General Precautions, Fall Risk Other position/activity restrictions: 2 incisions on lower back, 1 incision on lower stomach   Additional Pertinent Hx: Per Dr. Lana López H&P, \"Patient is a 69-year-old female with a history of severe lumbar spinal stenosis causing low back and leg pain complaints that does not improve with conservative treatment. Patient was taken to surgery on 11/3 by Dr. Pooja Vee at [facility] where she underwent L4-S1 anterior lumbar interbody fusion with pedicle screw fixation and laminectomy and decompression procedure. Postoperatively patient was started therapies, but needs more therapy before discharge to home safely. Patient also has history of hypertension and hyperlipidemia. Patient lives at home with her  in a 1 - level house. \"        Subjective: Pt supine in bed on arrival, agreeable to participate in PT treatment session. Pt denies pain. Objective  Pt transferred supine to sit with SBA with HOB elevated and use of bed rail. Sit <=> stand transfers completed throughout session at [facility] with RW and verbal cues for hand placement. Pt ambulates 113' within session using RW at [facility] with same mechanics as first session. On arrival to the therapy gym, pt requesting to perform bed mobility tasks on a raised bed. Pt transfers supine to/from sit in flat bed with SBA and verbal cues for sequencing and scooting in order to perform transfers without having LE contact with the ground. Pt performed step taps onto a four inch step with B UE support on RW and SBA. Pt also completes lateral step ups onto a four inch step with B UE support. Pt ends the treatment session performing 10 x STS without UE support and CGA-min A. Assessment:  This afternoon, the pt was able to increase her ambulation distance with SBA and use of the RW. Pt demonstrated ability to perform bed mobility tasks with SBA onto a raised bed to mimic her home environment. Pt continues to demonstrate gross LE weakness and will benefit from skilled PT to facilitate return to PLOF and to promote independence.        Safety Device - Type of devices: Pt left with transporter  []  All fall risk precautions in place [] Bed alarm in place  [] Call light within reach [] Chair alarm in place [] Positioning belt [x] Gait belt [] Patient at risk for falls [] Left in bed [] Left in chair [x] Telesitter in use [] Sitter present [] Nurse notified []  None      Therapy Time   Individual Co-treatment   Time In 1300     Time Out 1330     Minutes 30         Electronically signed by Philipp Muller PT on 11/15/2022 at 3:29 PM   Valarie Klein PT, DPT [id number]

## 2022-11-15 NOTE — PROGRESS NOTES
Department of Physical Medicine & Rehabilitation  Progress Note    Patient Identification:  Santi West  [id number]  : 1950  Admit date: 11/10/2022    Chief Complaint: Fusion of spine of lumbar region    Subjective:   No acute events overnight. Patient seen this am sitting up in gym. Reports constipation resolved but now with loose stools for the past ~3 days. Labs reviewed. ROS: No f/c, n/v, cp     Objective:  Patient Vitals for the past 24 hrs:   BP Temp Temp src Pulse Resp SpO2 Weight   11/15/22 0730 (!) 165/79 98.2 °F (36.8 °C) Oral (!) 106 16 93 % --   11/15/22 0508 (!) 156/97 98 °F (36.7 °C) Oral (!) 108 16 95 % 139 lb 5.3 oz (63.2 kg)   22 1935 129/60 98.5 °F (36.9 °C) Oral 99 16 93 % --   22 1910 -- -- -- -- 16 -- --   22 1840 -- -- -- -- 16 -- --   22 1438 -- -- -- -- 16 -- --     Const: Alert. No distress, pleasant. HEENT: Normocephalic, atraumatic. Normal sclera/conjunctiva. MMM. CV: Regular rate and rhythm. Resp: No respiratory distress. Lungs CTAB. Abd: Soft, nontender, nondistended, NABS+   Ext: No edema. MSK: decreased spine ROM  Neuro: Alert, oriented, appropriately interactive. Psych: Cooperative, appropriate mood and affect    Laboratory data: Available via EMR. Last 24 hour lab  No results found for this or any previous visit (from the past 24 hour(s)). Therapy progress:  Physical therapy:  Bed Mobility:  Overall Assistance Level: Stand By Assist  Additional Factors: Increased time to complete, Head of bed flat, Without handrails  Sit>supine:  Assistance Level: Stand by assist (for L LE)  Skilled Clinical Factors: no assist for LEs, slow due to pain  Supine>sit:  Assistance Level: Minimal assistance  Skilled Clinical Factors: HOB elevated slightly, use of R bed rail  Transfers:  Surface:  To chair with arms, From chair with arms, From bed, To bed  Additional Factors: Verbal cues, Hand placement cues, Increased time to complete  Sit>stand:  Assistance Level: Stand by assist  Skilled Clinical Factors: verbal cues for hand placement  Stand>sit:  Assistance Level: Stand by assist  Skilled Clinical Factors: verbal cues for hand placement, forgets to reach back for arm rests of chair  Bed<>chair  Technique: Stand pivot  Assistance Level: Stand by assist (with walker)  Stand Pivot:     Lateral transfer:     Car transfer:     Ambulation:  Device: Rolling walker  Distance: 220' x 2  Activity: Within Unit  Activity Comments: lesss pain L LE  Additional Factors: Hand placement cues, Verbal cues, Increased time to complete  Assistance Level: Stand by assist, Supervision  Gait Deviations: Slow quan, Decreased step length bilateral, Decreased weight shift bilateral, Decreased arm swing bilateral, Decreased trunk rotation (flexed slightly at hips, tends to walk behind walker base)  Skilled Clinical Factors: verbal cues to keep eyes/head up and look forward  Stairs:  Stair Height: 6''  Device: Standard walker  Number of Stairs: 1  Additional Factors:  (leading up and down with R LE)  Assistance Level: Contact guard assist  Skilled Clinical Factors: Pt able to recall sequencing for bringing walker up/down on curb step but forgot sequencing for ascending with stronger R leg and descending leading with weaker L leg  Curb:  Curb Height: 6'' (see above for curb step)  Device: Rolling walker  Number of Curbs: 1  Additional Factors: Verbal cues, Non-reciprocal going up, Non-reciprocal going down  Assistance Level: Contact guard assist, Minimal assistance  Skilled Clinical Factors: slight management wheeled walker descend, patient did lead L LE with no buckling, advised lead with R LE first  Wheelchair:     Assessment:  Assessment: Patient is a 68 y/o female who had surgery on 11/3 by Dr. Gregory Narayanan at [facility] where she underwent L4-S1 anterior lumbar interbody fusion with pedicle screw fixation and laminectomy and decompression procedure. Patient transfer to ARU on 11/10. Patient was independent with no device and lives with spouse. Patient has 1 step to enter home. Patient required SBA for transfers with cus for wheeled walker safety and able to ambulate with wheeled walker community distances with SBA, guarded and slow movements. Patient able to perform bed mobility with SBA with significant improvement. . patient limited due to pain , weakness primary L LE and nausea. Patient would benefit from ARU to improve overall strength, balance, improve pain management  in order to achieve functional mobility to independence to return home. Anticipate D/C towards end of week. Activity Tolerance: Patient limited by pain, Patient limited by fatigue, Patient tolerated treatment well  Discharge Recommendations: Continue to assess pending progress, Patient would benefit from continued therapy after discharge, Home with Home health PT, Home with assist PRN      Occupational therapy:   Feeding  Assistance Level: Independent  Grooming/Oral Hygiene  Assistance Level: Stand by assist, Set-up  Skilled Clinical Factors: standing at sink to wash hands and brush teeth, but fatigued while brushing teeth requiring chair be brought up for seated rest break. Completed remained of brushing teeth and comb hair in seated. UE Bathing  Assistance Level: Stand by assist, Set-up  Skilled Clinical Factors: seated on shower chiar  LE Bathing  Equipment Provided: Long-handled sponge  Assistance Level: Minimal assistance  Skilled Clinical Factors: pt used LH sponge to wash LE's SBA, VC's to maintain spinal precautions. Pt stood at grab bars with wash periarea/buttocks with SBA, but needed assist to dry buttocks and backs of thighs in stance at [address] Level:  Moderate assistance, Minimal assistance  Skilled Clinical Factors: doffed shirt/bra SBA, min A to don bra, mod A to don button up shirt  LE Dressing  Equipment Provided: Reachers, Sock aid, Long-handled shoe-horn  Assistance Level: Moderate assistance  Skilled Clinical Factors: to don/doff pants, underwear using A/E to maintain spinal precautions with LB dressing. Pt educated to don L LE first d/t L LE more painful, but still needed assist despite attempt at use of reacher. SBA to manage clothing up over hips. Pt administered elastic laces for shoes. Putting On/Taking Off Footwear  Equipment Provided: Reachers, Sock aid (long shoehorn)  Assistance Level: Moderate assistance  Skilled Clinical Factors: pt used reacher to [employer] socks, educated on using sock aid to don CHANA hose, required assist to thread B CHANA hose onto sock aid, able to thread foot through sock aid, pull over heel, but required assist to pull CHANA hose up calves, assist to remove sock aid, pt with increased difficulty as sock aid has foam piece making it more difficult to thread and remove hose from sock aid, able to use long shoe horn to don B shoes with assist to adjust the tongue of shoe. will cont education with AE to increase independence in dressing tasks  Toileting  Assistance Level: Stand by assist, Contact guard assist  Skilled Clinical Factors: to void urine at commode x2 trials, SBA to manage clothing down/up at RW and for pericare  Assessment:  Assessment: Pt reports decreaed nausea over the weekend, as she is taking pain medications after eating with improvements. However, pt reported mild nausea during AE training, passing quickly, reported she was going to request Zofran from nursing when back in room. Pt completed dynamometer and nine hole peg testing to assess functional use of hands d/t previous c/o decreased strength in R hand. Results indicate pt is limited by both strength and fine motor coordination in B hands. Pt educated on use of reacher, sock aid, and long shoehorn for donning/doffing footwear, requiring mod assist for use of equipment, will cont to educate pt.  Pt to be discussed in conf tomorrow morning regarding discharge recommendations, anticipate pt will be able to return home with assist from spouse PRN, home OT. Antiicpate pt will need long shoe horn, shower seat with back, cont to assess DME needs. Activity Tolerance: Patient limited by pain, Patient limited by fatigue, Patient tolerated treatment well  Discharge Recommendations: Continue to assess pending progress, Home with assist PRN, Patient would benefit from continued therapy after discharge, Home with Home health OT    Speech therapy:            PT  Position Activity Restriction  Spinal Precautions: No Bending, No Lifting, No Twisting  Other position/activity restrictions: 2 incisions on lower back, 1 incision on lower stomach  Objective     Sit to Stand: Contact guard assistance (cues for hand placement)  Stand to Sit: Contact guard assistance (cues for hand placement)  Bed to Chair: Contact guard assistance (cues for safety with wheeled walker)  Device: Rolling Walker  Assistance: Contact guard assistance  Distance: 61' with several turns( 126' in PM)  OT  PT Equipment Recommendations  Equipment Needed: No  Other: has wheeled walker  Toilet - Technique: Ambulating  Equipment Used: Standard toilet  Assessment        SLP          Body mass index is 26.33 kg/m². Assessment and Plan:    Lumbar stenosis with neurogenic claudication  -s/p  L4-S1 anterior lumbar interbody fusion with pedicle screw fixation and laminectomy and decompression (11/3 with Dr. Pooja Vee)  -PT/OT    HTN  -lisinopril, metoprolol    HLD  -atorvastatin    Bladder  -High risk retention  -Monitor PVRs, straight cath prn >300    Bowel  -Constipation resolve,d now with loose stools. -Bowel regimen prn    Pain control  -Norco prn    Ppx  -lovenox, ppi    Rehab Progress: Interdisciplinary team conference was held today with entire rehab treatment team including PT, OT, SLP, Dietician, RN, and SW. Discussion focused on progress toward rehab goals and discharge planning. Making progress.  Working on functional mobility, balance, compensatory strategies. Limited by spinal precautions. Separate conference then held with patient/family (if available), questions answered and concerns addressed. Total treatment time >35 min with greater than 50% spent in care coordination. Anticipated Dispo: home with   Services: HH Pt, OT, RN  DME: shower chair, TSF, shoe horn, 1/2 bed rail  ELOS: 11/18      Jaycee Briceno.  Osmar Bergeron MD 11/15/2022, 9:51 AM

## 2022-11-15 NOTE — PROGRESS NOTES
Occupational Therapy  Facility/Department: [facility]  REHAB  Rehabilitation Occupational Therapy Daily Treatment Note    Date: 11/15/22  Patient Name: Jessica Schaeffer       Room: I6E-4482/2232-79  MRN: [id number]  Account: [de-identified]   : 1950  (73 y.o.) Gender: female                    Past Medical History:  has a past medical history of Hypertension and PONV (postoperative nausea and vomiting). Past Surgical History:   has a past surgical history that includes Tonsillectomy; Tubal ligation; and cyst removal.    Restrictions  Restrictions/Precautions: General Precautions; Fall Risk  Other position/activity restrictions: 2 incisions on lower back, 1 incision on lower stomach    Subjective  Subjective: pt met bedside in room, resting in the recliner upon arrival, stated she was feeling tired but agreeable to OT this AM  Restrictions/Precautions: General Precautions; Fall Risk             Objective     Cognition  Safety Judgement: Decreased awareness of need for safety (reminders for spinal precautions and hand placement with fxl transfers)  Problem Solving: Decreased awareness of errors  Orientation  Overall Orientation Status: Within Normal Limits         ADL  Grooming/Oral Hygiene  Assistance Level: Supervision  Skilled Clinical Factors: pt completed oral hygiene while standing at sink in bathroom  Upper Extremity Bathing  Assistance Level: Supervision  Skilled Clinical Factors: washed UEs, chest and abdomen while seated on shower chair  Lower Extremity Bathing  Equipment Provided: Long-handled sponge  Assistance Level: Contact guard assist  Skilled Clinical Factors: washed thighs with wash cloth, long sponge to reach feet, standing with grab bars and sup to wash buttocks/periarea, touching assist to thoroughly clean BM residue from buttocks, pt able to dry all areas  Upper Extremity Dressing  Assistance Level: Modified independent;Verbal cues  Skilled Clinical Factors: pt donned bra in front, twisting to back to place straps, verbal cues to maintain precautions while turning bra, donning long sleeve shirt overhead while seated  Lower Extremity Dressing  Equipment Provided: Reachers  Assistance Level: Stand by assist  Skilled Clinical Factors: pt able to thread BLE through underpants using reacher with increased time, verbal cues for sequence, crossing RLE over LLE to thread R foot first, using reacher to thread LLE, able to thread BLE through pants using reacher with increased time, able to stand and manage over hips with sup, initial pair of pants were too small, SOT assisted with changing of pants d/t time constraints  Putting On/Taking Off Footwear  Equipment Provided:  (long shoehorn)  Assistance Level: Moderate assistance  Skilled Clinical Factors: pt required assist to don CHANA hose d/t time constraints, able to thread B shoes using shoehorn with touching assist to manage tongue of shoe, will educate pt on using reacher to adjust tongue while donning shoes  Tub/Shower Transfers  Type: Shower  Transfer From: Rolling walker  Transfer To: Shower chair with back  Assistance Level: Supervision          Functional Mobility  Device: Rolling walker  Activity: To/From bathroom; Retrieve items;Transport items  Assistance Level: Supervision  Transfers  Surface: To chair without arms;From chair with arms;From chair without arms; Wheelchair  Device: Walker  Sit to [employer] Level: Supervision  Stand to [employer] Level: Supervision         Assessment  Assessment  Assessment: Pt tolerated session well this morning, able to complete functional transfers and mobility with supervision using RW. Pt cont to requires verbal cues for reminders of application of spinal precautions. Pt completed UB bathing supervision, LB bathing CGA, UB dressing mod indep, LB dressing SBA, footwear mod assist, grooming supervision. Pt cont to be limited by back and LLE pain, decreased application of precautions, poor activity tolerance. Pt would benefit from further education on increased activity tolerance and using ADL AE. Pt discussed in IPR team conference this AM, anticipate pt will discharge home on Friday with assist from  as needed, home OT. Pt will require a shower seat with a back, long shoehorn, half bed rail, TSF, and grab bars in shower at discharge. Activity Tolerance: Patient limited by pain; Patient limited by fatigue;Patient tolerated treatment well  Discharge Recommendations: Continue to assess pending progress;Home with assist PRN;Patient would benefit from continued therapy after discharge;Home with Home health OT  OT Equipment Recommendations  Equipment Needed: Yes  Mobility Devices: ADL Assistive Devices  ADL Assistive Devices: Long-handled Shoe Horn;Shower Chair with back; Toilet Safety Frame;Grab Bars - shower  Other: Half bed rail  Safety Devices  Safety Devices in place: Yes  Type of devices: All fall risk precautions in place;Gait belt;Call light within reach; Chair alarm in place; Left in chair    PM Session     Pt met in dept, appeared in a pleasant mood, agreeable to OT this PM.     Pt able to remove B gym shoes by kicking them off with the opposite foot. Instructed on removing CHANA hose using rubber end of long shoe horn, however, pt reported it hurt her L thigh and was causing strain on her back, SOT assisted with removing CHANA hose. SOT removed foam piece from pt sock aid to assist with independence while donning socks. Pt required assist to thread CHANA hose onto sock aid, but was able to don hose, able to adjust L hose by crossing LLE over RLE, required assist to adjust R hose. Pt educated on use of both reacher and long shoehorn while donning shoes. Able to don B shoes indep with increased time to complete. UE ex- pt completed x10 each exercise with lime green resistance band on BUE to increase strength and activity tolerance for ADL participation (shoulder flex, horizontal abd/add, elbow flex/ext). Provided pt with home exercise program of resistance band exercises and light green resistance band. Pt returned to room at end of session with dept transport. Patient Education  Education  Education Given To: Patient  Education Provided: Precautions; Safety;ADL Function  Education Method: Demonstration;Verbal;Teach Back  Barriers to Learning: None  Education Outcome: Verbalized understanding;Demonstrated understanding;Continued education needed    Plan  Occupational Therapy Plan  Times Per Week: 5-6  Times Per Day: Twice a day  Days Per Week: 5 Days  Hours Per Day: 1.5 hours  Therapy Duration: 10 Days (7-10 Days)  Current Treatment Recommendations: Strengthening;Balance training;Functional mobility training; Endurance training;Pain management; Safety education & training;Patient/Caregiver education & training;Equipment evaluation, education, & procurement;Self-Care / ADL; Home management training    Goals  Patient Goals   Patient goals : pt stated she wants to start feeling better  Short Term Goals  Time Frame for Short Term Goals: in 7-10 days pt will be. ..   Short Term Goal 1: indep with toileting using grab bars PRN  Short Term Goal 2: indep with bathing using AE to maintain spinal precautions  Short Term Goal 3: indep with UB dressing  Short Term Goal 4: indep with LB dressing (exception of CHANA hose) using AE to maintain spinal precautions  Short Term Goal 5: indep with functional mobility and transfers using LRAD while maintaining spinal precautions  Additional Goals?: Yes  Short Term Goal 6: indep with IADL tasks using LRAD and maintaining spinal precautions                Therapy Time   Individual Concurrent Group Co-treatment   Time In 0900         Time Out 1000         Minutes 60         Timed Code Treatment Minutes: 60 Minutes     Therapy Time     Individual Co-treatment   Time In 8888     Time Out 1600     Minutes [address] S/OT    Therapist was present, directed the patient's care, made skilled judgement, and was responsible for assessment and treatment of the patient.        Aaron Grijalva OTR/L  #[id number] 11/15/22

## 2022-11-15 NOTE — PROGRESS NOTES
Physical Therapy  Facility/Department: [facility]  Rehabilitation Physical Therapy Treatment Note    NAME: Gerri Wick  : 1950 (67 y.o.)  MRN: [id number]  CODE STATUS: Full Code    Date of Service: 11/15/22       Restrictions:  Restrictions/Precautions: General Precautions; Fall Risk  Position Activity Restriction  Spinal Precautions: No Bending; No Lifting; No Twisting  Other position/activity restrictions: 2 incisions on lower back, 1 incision on lower stomach     SUBJECTIVE  Subjective  Subjective: Pt. agreeable to therapy, reports having some loose stools today  Pain: Denies pain, Pain med on board During session, Pain would intermittently be present down L LE with WB and activity                 OBJECTIVE  Cognition  Overall Cognitive Status: Exceptions  Following Commands: Follows all commands without difficulty  Attention Span: Appears intact  Memory: Decreased short term memory (mild degree, required repeatative cues for walker on steps and upright posture with ambulation)  Safety Judgement: Decreased awareness of need for safety  Problem Solving: Decreased awareness of errors  Cognition Comment: cues for wheeled walker safety  Orientation  Overall Orientation Status: Within Normal Limits    Functional Mobility  Balance  Sitting Balance: Independent  Standing Balance: Stand by assistance  Standing Balance  Activity: stood with wheeled walker SBA  Comments: steady  Transfers  Surface: To chair with arms;From chair with arms;From bed; To bed  Additional Factors: Verbal cues; Hand placement cues; Increased time to complete  Sit to Stand  Assistance Level: Stand by assist  Skilled Clinical Factors: verbal cues for hand placement  Stand to Sit  Assistance Level: Stand by assist  Skilled Clinical Factors: verbal cues for hand placement, forgets to reach back for arm rests of chair, cues for upright posture      Environmental Mobility  Ambulation  Device: Rolling walker  Distance: 220' x 2  Activity: Within Unit  Additional Factors: Hand placement cues; Verbal cues; Increased time to complete  Assistance Level: Stand by assist;Supervision  Gait Deviations: Slow quan;Decreased step length bilateral;Decreased weight shift bilateral;Decreased arm swing bilateral;Decreased trunk rotation  Skilled Clinical Factors: verbal cues to keep eyes/head up and look forward stand closer to the walker and to land with heel vs forefoot  Stairs  Stair Height: 6''  Device: Rolling walker  Number of Stairs: 12  Additional Factors: Reciprocal going up;Reciprocal going down  Assistance Level: Contact guard assist  Skilled Clinical Factors: cues for sequencing and cues to get entire foot onto the step, Pt. modified appropriately  Curb  Curb Height: 6''  Device: Rolling walker  Additional Factors: Verbal cues; Non-reciprocal going up;Non-reciprocal going down  Assistance Level: Contact guard assist  Skilled Clinical Factors: Needed assist to recall sequence and cues for safety with the sequence             PT Exercises  Exercise Treatment: Seated LE exercises:  marching x 15, LAQ x 15 and AP x 15;   Standing exercises in the FWW : marching x 10, heel raises x 10 and hip abd/add x 10      ASSESSMENT/PROGRESS TOWARDS GOALS       Assessment  Assessment: Patient is a 66 y/o female who had surgery on 11/3 by Dr. Les Arthur at [facility] where she underwent L4-S1 anterior lumbar interbody fusion with pedicle screw fixation and laminectomy and decompression procedure. Patient transfer to ARU on 11/10. Patient was independent with no device and lives with spouse. Patient has 1 step to enter home. Patient required SBA for transfers with cus for wheeled walker safety and able to ambulate with wheeled walker community distances with Supervision d/t needed cues for improved pattern and safety. .Patient limited due to pain , weakness primary L LE .  Patient would continue to benefit from ARU to improve overall strength, balance, improve pain management  in order to achieve functional mobility to independence to return home. Anticipate D/C towards end of week. Activity Tolerance: Patient limited by pain; Patient limited by fatigue;Patient tolerated treatment well  Discharge Recommendations: Continue to assess pending progress; Patient would benefit from continued therapy after discharge;Home with Home health PT;Home with assist PRN  PT Equipment Recommendations  Equipment Needed: No  Other: has wheeled walker    Goals  Patient Goals   Patient Goals : Patient's goal is to go home and take care of herself  Short Term Goals  Time Frame for Short Term Goals: 7-10 days  Short Term Goal 1: transfer with MI  Short Term Goal 2: Bed mobility with MI  Short Term Goal 3: ambulate with wheeled walker 150' with MI  Short Term Goal 4: ascend/descend curb step with wheeled walker with SBA  Long Term Goals  Time Frame for Long Term Goals : STG= LTG    PLAN OF CARE/SAFETY  Physcial Therapy Plan  General Plan:  minutes of therapy at least 5 out of 7 days a week  Days Per Week: 5 Days  Hours Per Day: 1.5 hours  Therapy Duration: 10 Days (7-10 days)  Specific Instructions for Next Treatment: curb step assess  Current Treatment Recommendations: Strengthening;Balance training;Functional mobility training;Transfer training;ADL/Self-care training; Endurance training;Gait training;Stair training;Pain management;Home exercise program;Safety education & training;Patient/Caregiver education & training;Equipment evaluation, education, & procurement;Positioning; Therapeutic activities  Safety Devices  Type of Devices: Patient at risk for falls; All fall risk precautions in place;Gait belt;Left in chair (transport to room)    EDUCATION  Education  Education Given To: Patient  Education Provided: Role of Therapy;Plan of Care;Precautions; Safety;ADL Function;Mobility Training;Transfer Training;Home Exercise Program;Family Education;Equipment  Education Provided Comments: jin step  Education Method: Demonstration;Verbal;Teach Back  Barriers to Learning: None  Education Outcome: Verbalized understanding;Continued education needed;Demonstrated understanding        Therapy Time   Individual Concurrent Group Co-treatment   Time In 1030         Time Out 1130         Minutes Irlanda Hernández6, PT, [id number] 11/15/22 at 11:38 AM

## 2022-11-15 NOTE — PROGRESS NOTES
This is a 67 y.o.  female admitted on 11/10/2022 with Fusion of spine of lumbar region [M43.26]. A&O X 4. Active bowel sounds. Last BM 11/14/22. Patient is continent of bowel & bladder. Patient is on a regular diet and takes her pills whole with thins. Skin: surgical incisions on the lower back and abdomen. Transfers with  walker x 1. Patient is on Lovenox for DVT prophylaxis. HS medication given. Tolerated well. Call light and bedside table within reach. Patient instructed to call if there is any needs or changes.

## 2022-11-16 PROCEDURE — 6360000002 HC RX W HCPCS: Performed by: PHYSICAL MEDICINE & REHABILITATION

## 2022-11-16 PROCEDURE — 97530 THERAPEUTIC ACTIVITIES: CPT

## 2022-11-16 PROCEDURE — 97116 GAIT TRAINING THERAPY: CPT | Performed by: PHYSICAL THERAPIST

## 2022-11-16 PROCEDURE — 1280000000 HC REHAB R&B

## 2022-11-16 PROCEDURE — 97110 THERAPEUTIC EXERCISES: CPT

## 2022-11-16 PROCEDURE — 94760 N-INVAS EAR/PLS OXIMETRY 1: CPT

## 2022-11-16 PROCEDURE — 97530 THERAPEUTIC ACTIVITIES: CPT | Performed by: PHYSICAL THERAPIST

## 2022-11-16 PROCEDURE — 97535 SELF CARE MNGMENT TRAINING: CPT

## 2022-11-16 PROCEDURE — 97110 THERAPEUTIC EXERCISES: CPT | Performed by: PHYSICAL THERAPIST

## 2022-11-16 PROCEDURE — 6370000000 HC RX 637 (ALT 250 FOR IP): Performed by: PHYSICAL MEDICINE & REHABILITATION

## 2022-11-16 RX ADMIN — Medication 400 MG: at 20:09

## 2022-11-16 RX ADMIN — DIPHENHYDRAMINE HCL 25 MG: 25 TABLET ORAL at 20:09

## 2022-11-16 RX ADMIN — Medication 400 MG: at 08:04

## 2022-11-16 RX ADMIN — PANTOPRAZOLE SODIUM 40 MG: 40 TABLET, DELAYED RELEASE ORAL at 05:51

## 2022-11-16 RX ADMIN — Medication 1 CAPSULE: at 08:06

## 2022-11-16 RX ADMIN — ENOXAPARIN SODIUM 40 MG: 100 INJECTION SUBCUTANEOUS at 20:09

## 2022-11-16 RX ADMIN — LISINOPRIL 40 MG: 40 TABLET ORAL at 08:07

## 2022-11-16 RX ADMIN — POLYVINYL ALCOHOL 1 DROP: 14 SOLUTION/ DROPS OPHTHALMIC at 08:07

## 2022-11-16 RX ADMIN — ATORVASTATIN CALCIUM 10 MG: 10 TABLET, FILM COATED ORAL at 20:09

## 2022-11-16 RX ADMIN — METOPROLOL SUCCINATE 25 MG: 25 TABLET, EXTENDED RELEASE ORAL at 08:06

## 2022-11-16 RX ADMIN — POLYVINYL ALCOHOL 1 DROP: 14 SOLUTION/ DROPS OPHTHALMIC at 20:10

## 2022-11-16 RX ADMIN — HYDROCODONE BITARTRATE AND ACETAMINOPHEN 2 TABLET: 5; 325 TABLET ORAL at 13:56

## 2022-11-16 RX ADMIN — HYDROCODONE BITARTRATE AND ACETAMINOPHEN 2 TABLET: 5; 325 TABLET ORAL at 20:10

## 2022-11-16 RX ADMIN — Medication 2000 UNITS: at 08:06

## 2022-11-16 RX ADMIN — HYDROCODONE BITARTRATE AND ACETAMINOPHEN 2 TABLET: 5; 325 TABLET ORAL at 08:04

## 2022-11-16 RX ADMIN — Medication 1 TABLET: at 08:04

## 2022-11-16 ASSESSMENT — PAIN DESCRIPTION - ONSET: ONSET: ON-GOING

## 2022-11-16 ASSESSMENT — PAIN DESCRIPTION - FREQUENCY: FREQUENCY: CONTINUOUS

## 2022-11-16 ASSESSMENT — PAIN SCALES - GENERAL
PAINLEVEL_OUTOF10: 7
PAINLEVEL_OUTOF10: 3
PAINLEVEL_OUTOF10: 7
PAINLEVEL_OUTOF10: 7
PAINLEVEL_OUTOF10: 0

## 2022-11-16 ASSESSMENT — PAIN DESCRIPTION - PAIN TYPE: TYPE: ACUTE PAIN

## 2022-11-16 ASSESSMENT — PAIN DESCRIPTION - ORIENTATION
ORIENTATION: LEFT
ORIENTATION: LEFT

## 2022-11-16 ASSESSMENT — PAIN DESCRIPTION - LOCATION
LOCATION: LEG;OTHER (COMMENT)
LOCATION: OTHER (COMMENT)

## 2022-11-16 ASSESSMENT — PAIN DESCRIPTION - DESCRIPTORS
DESCRIPTORS: ACHING;DISCOMFORT
DESCRIPTORS: ACHING;DISCOMFORT

## 2022-11-16 NOTE — PROGRESS NOTES
gathered clothes from/to closet, hanging briefly on walker as instr how to carry clothes (if no basket used). pt up for 5-6 min. Sit to Supine  Assistance Level: Supervision  Skilled Clinical Factors: Pt returned to her hospital bed at end of tx. Cues for log roll techn for spinal precautions. OT Exercises  Resistive Exercises: daniel box ex completed for 25 reps x2 bouts. Assessment  Assessment  Assessment: Pt tolerated tx fairly well, limited by decreased activity tolerance/endurance. Pt has made progress towards goals, completing transfers with Supervision and ADL toileting task with Supervision. Pt Supervision for bed mob, sit>supine, yet cued for regarding spinal precautions. Pt completed IADL tasks with Supervision (made oatmeal in microwave and gathered clothes from closet) with Supervision and min cues for safe walker use. Cont poc and plan for d/c home this Friday with [address] [place] [address]. Activity Tolerance: Patient limited by fatigue;Patient limited by pain (mod pain(5); c/o fatigue)  Discharge Recommendations: Continue to assess pending progress;Home with assist PRN;Patient would benefit from continued therapy after discharge;Home with Home health OT  OT Equipment Recommendations  ADL Assistive Devices: Long-handled Shoe Horn;Shower Chair with back; Toilet Safety Frame;Grab Bars - shower  Other: Half bed rail  Safety Devices  Safety Devices in place: Yes  Type of devices: Left in bed; All fall risk precautions in place;Call light within reach;Gait belt;Bed alarm in place    Patient Education  Education  Education Given To: Patient  Education Provided: Precautions; Safety;IADL Function  Education Method: Demonstration;Verbal;Teach Back  Education Outcome: Verbalized understanding;Demonstrated understanding;Continued education needed    Plan  Occupational Therapy Plan  Times Per Week: 5-6  Times Per Day: Twice a day  Days Per Week: 5 Days  Hours Per Day: 1.5 hours  Therapy Duration: 10 Days  Current Treatment Recommendations: Strengthening;Balance training;Functional mobility training; Endurance training;Pain management; Safety education & training;Patient/Caregiver education & training;Equipment evaluation, education, & procurement;Self-Care / ADL; Home management training    Goals  Patient Goals   Patient goals : pt stated she wants to start feeling better  Short Term Goals  Time Frame for Short Term Goals: in 7-10 days pt will be. ..   Short Term Goal 3: indep with UB dressing  Short Term Goal 4: indep with LB dressing (exception of CHANA hose) using AE to maintain spinal precautions  Short Term Goal 5: indep with functional mobility and transfers using LRAD while maintaining spinal precautions  Short Term Goal 6: indep with IADL tasks using LRAD and maintaining spinal precautions      Therapy Time   Individual Concurrent Group Co-treatment   Time In 0945         Time Out 1030         Minutes 45                 Geni YAN/BRITT,515

## 2022-11-16 NOTE — PROGRESS NOTES
This is a 67 y.o.  female admitted on 11/10/2022 with Fusion of spine of lumbar region [M43.26]. A&O X 4. Active bowel sounds. Last BM 11/15/22. Patient is continent of bowel & bladder. Patient is on a regular diet and takes her pills whole with thins. Skin: surgical incisions on the lower back and abdomen. Transfers with  walker x 1. Patient is on Lovenox for DVT prophylaxis. HS medication given. Tolerated well. Call light and bedside table within reach. Patient instructed to call if there is any needs or changes.

## 2022-11-16 NOTE — PLAN OF CARE
Problem: Discharge Planning  Goal: Discharge to home or other facility with appropriate resources  11/16/2022 0111 by Robert Henriquez RN  Outcome: Progressing     Problem: Safety - Adult  Goal: Free from fall injury  11/16/2022 0111 by Robert Henriquez RN  Outcome: Progressing     Problem: ABCDS Injury Assessment  Goal: Absence of physical injury  11/16/2022 0111 by Robert Henriquez RN  Outcome: Progressing     Problem: Pain  Goal: Verbalizes/displays adequate comfort level or baseline comfort level  11/16/2022 0111 by Robert Henriquez RN  Outcome: Progressing       Problem: Nutrition Deficit:  Goal: Optimize nutritional status  11/16/2022 0111 by Robert Henriquez RN  Outcome: Progressing

## 2022-11-16 NOTE — PROGRESS NOTES
Department of Physical Medicine & Rehabilitation  Progress Note    Patient Identification:  Danii Quinones  [id number]  : 1950  Admit date: 11/10/2022    Chief Complaint: Fusion of spine of lumbar region    Subjective:   No acute events overnight. Patient seen this am resting in bed. Reports feeling unwell this morning (unable to describe symptoms) but improved currently. Requests having f/u xrays done inpatient since she has outpatient appointment with Dr. Sejal Joseph on Monday. Labs reviewed. ROS: No f/c, n/v, cp     Objective:  Patient Vitals for the past 24 hrs:   BP Temp Temp src Pulse Resp SpO2 Weight   22 0904 -- -- -- -- -- 92 % --   22 0804 (!) 180/78 -- -- (!) 114 16 -- --   22 0436 (!) 161/64 97.9 °F (36.6 °C) Oral (!) 103 18 92 % 139 lb 1.8 oz (63.1 kg)   11/15/22 1958 135/73 98.1 °F (36.7 °C) Oral 100 16 92 % --   11/15/22 1622 (!) 163/96 98.8 °F (37.1 °C) Oral (!) 102 16 96 % --   11/15/22 1452 -- -- -- -- 16 -- --     Const: Alert. No distress, pleasant. HEENT: Normocephalic, atraumatic. Normal sclera/conjunctiva. MMM. CV: Regular rate and rhythm. Resp: No respiratory distress. Lungs CTAB. Abd: Soft, nontender, nondistended, NABS+   Ext: No edema. MSK: decreased spine ROM  Neuro: Alert, oriented, appropriately interactive. Psych: Cooperative, appropriate mood and affect    Laboratory data: Available via EMR. Last 24 hour lab  No results found for this or any previous visit (from the past 24 hour(s)). Therapy progress:  Physical therapy:  Bed Mobility:  Overall Assistance Level: Stand By Assist  Additional Factors: Increased time to complete, Head of bed flat, Without handrails  Sit>supine:  Assistance Level: Stand by assist (for L LE)  Skilled Clinical Factors: no assist for LEs, slow due to pain  Supine>sit:  Assistance Level: Minimal assistance  Skilled Clinical Factors: HOB elevated slightly, use of R bed rail  Transfers:  Surface:  To chair with arms, From chair with arms  Additional Factors: Verbal cues, Increased time to complete  Device: Walker  Sit>stand:  Assistance Level: Stand by assist  Skilled Clinical Factors: verbal cues for hand placement  Stand>sit:  Assistance Level: Supervision  Skilled Clinical Factors: verbal cues for hand placement, forgets to reach back for arm rests of chair, cues for upright posture  Bed<>chair  Technique: Stand pivot  Assistance Level: Stand by assist (with walker)  Stand Pivot:     Lateral transfer:     Car transfer:     Ambulation:  Surface: Level surface  Device: Rolling walker  Distance: 225' x 2  Activity: Within Unit  Activity Comments: less pain L LE  Additional Factors: Verbal cues, Increased time to complete  Assistance Level: Supervision  Gait Deviations: Slow quan, Decreased step length bilateral, Decreased weight shift bilateral, Decreased arm swing bilateral, Decreased trunk rotation  Skilled Clinical Factors: verbal cues to keep eyes/head up and look forward stand closer to the walker and to land with heel vs forefoot  Stairs:  Stair Height: 6''  Device: Rolling walker  Number of Stairs: 12  Additional Factors: Reciprocal going up, Reciprocal going down  Assistance Level: Contact guard assist  Skilled Clinical Factors: cues for sequencing and cues to get entire foot onto the step, Pt. modified appropriately  Curb:  Curb Height: 6''  Device: Rolling walker  Number of Curbs: 2  Additional Factors: Verbal cues, Non-reciprocal going up, Non-reciprocal going down  Assistance Level: Stand by assist  Skilled Clinical Factors: cued to step farther into walker to make lifting the walker easier. Wheelchair:     Assessment:  Assessment: pt was able to complete all exercises this session. she was able to complete 2 community level distance walks w FWW and supervision x 1. pt was also able to complete 2 curb steps today w SBA x 1. v/c are still required for safe transfers and functional mobility tasks.  pt is limited by pain, decreased endurance, impaired gait, and impaired functional mobility. She will continue to benefit from skilled PT to improve her strength, endurance, gait, and functional mobility to facilitate a safe return to home w independent ADL's. plan for d/c set for 11/18 to home w assistance from family. Activity Tolerance: Patient limited by fatigue, Patient limited by pain (mod pain(5); c/o fatigue)  Discharge Recommendations: Home with assist PRN, Home with Home health PT, Patient would benefit from continued therapy after discharge      Occupational therapy:   Feeding  Assistance Level:  Independent  Grooming/Oral Hygiene  Assistance Level: Supervision  Skilled Clinical Factors: pt completed oral hygiene while standing at sink in bathroom  UE Bathing  Assistance Level: Supervision  Skilled Clinical Factors: washed UEs, chest and abdomen while seated on shower chair  LE Bathing  Equipment Provided: Long-handled sponge  Assistance Level: Contact guard assist  Skilled Clinical Factors: washed thighs with wash cloth, long sponge to reach feet, standing with grab bars and sup to wash buttocks/periarea, touching assist to thoroughly clean BM residue from buttocks, pt able to dry all areas  UE Dressing  Assistance Level: Modified independent, Verbal cues  Skilled Clinical Factors: pt donned bra in front, twisting to back to place straps, verbal cues to maintain precautions while turning bra, donning long sleeve shirt overhead while seated  LE Dressing  Equipment Provided: Reachers  Assistance Level: Stand by assist  Skilled Clinical Factors: pt able to thread BLE through underpants using reacher with increased time, verbal cues for sequence, crossing RLE over LLE to thread R foot first, using reacher to thread LLE, able to thread BLE through pants using reacher with increased time, able to stand and manage over hips with sup, initial pair of pants were too small, SOT assisted with changing of pants d/t time constraints  Putting On/Taking Off Footwear  Equipment Provided:  (long shoehorn)  Assistance Level: Moderate assistance  Skilled Clinical Factors: pt required assist to don CHANA hose d/t time constraints, able to thread B shoes using shoehorn with touching assist to manage tongue of shoe, will educate pt on using reacher to adjust tongue while donning shoes  Toileting  Assistance Level: Supervision  Skilled Clinical Factors: Voided urine on commode in her BR. Pt managed hygiene and clothes. Assessment:  Assessment: Pt tolerated tx fairly well, limited by decreased activity tolerance/endurance. Pt has made progress towards goals, completing transfers with Supervision and ADL toileting task with Supervision. Pt Supervision for bed mob, sit>supine, yet cued for regarding spinal precautions. Pt completed IADL tasks with Supervision (made oatmeal in microwave and gathered clothes from closet) with Supervision and min cues for safe walker use. Cont poc and plan for d/c home this Friday with [address] [place] [id number]   Activity Tolerance: Patient limited by fatigue, Patient limited by pain (mod pain(5); c/o fatigue)  Discharge Recommendations: Continue to assess pending progress, Home with assist PRN, Patient would benefit from continued therapy after discharge, Home with Home health OT    Speech therapy:            PT  Position Activity Restriction  Spinal Precautions: No Bending, No Lifting, No Twisting  Other position/activity restrictions: 2 incisions on lower back, 1 incision on lower stomach  Objective     Sit to Stand: Contact guard assistance (cues for hand placement)  Stand to Sit: Contact guard assistance (cues for hand placement)  Bed to Chair: Contact guard assistance (cues for safety with wheeled walker)  Device: Rolling Walker  Assistance: Contact guard assistance  Distance: 61' with several turns( 126' in PM)  OT  PT Equipment Recommendations  Equipment Needed: No  Other: has wheeled walker  Toilet - Technique: Ambulating  Equipment Used: Standard toilet  Assessment        SLP          Body mass index is 26.28 kg/m². Assessment and Plan:    Lumbar stenosis with neurogenic claudication  -s/p  L4-S1 anterior lumbar interbody fusion with pedicle screw fixation and laminectomy and decompression (11/3 with Dr. Rubén Hunter)  -Plan for f/u xrays 11/17  -PT/OT    HTN  -lisinopril, metoprolol    HLD  -atorvastatin    Bladder  -High risk retention  -Monitor PVRs, straight cath prn >300    Bowel  -Constipation resolve,d now with loose stools. -Bowel regimen prn    Pain control  -Norco prn    Ppx  -lovenox, ppi    Rehab Progress: Making progress. Working on functional mobility, balance, compensatory strategies. Limited by spinal precautions. Anticipated Dispo: home with   Services:  Pt, OT, RN  DME: shower chair, TSF, shoe horn, 1/2 bed rail  ELOS: 11/18      [address]  Ivan Sweet MD 11/16/2022, 12:55 PM

## 2022-11-16 NOTE — PROGRESS NOTES
OCCUPATIONAL THERAPY  Progress Note   Second Session    Patient Name: Amanda Tim  Medical Record Number: [id number]    Treatment Diagnosis: decreased functional mobility    General  Chart Reviewed: Yes, Orders, Progress Notes, History and Physical  Additional Pertinent Hx: Patient is a 75-year-old female with a history of severe lumbar spinal stenosis causing low back and leg pain complaints that does not improve with conservative treatment. Patient was taken to surgery on 11/3 by Dr. Higinio Rodríguez at [facility] where she underwent L4-S1 anterior lumbar interbody fusion with pedicle screw fixation and laminectomy and decompression procedure. Postoperatively patient was started therapies, but needs more therapy before discharge to home safely. Patient also has history of hypertension and hyperlipidemia. Patient lives at home with her family in a 1 - level house. (Copied Dr Leeanna Hay note 11/10/22)  Family / Caregiver Present: No  Referring Practitioner: Omar Dickerson  Diagnosis: spinal lumbar fusion     Restrictions/Precautions  Restrictions/Precautions: General Precautions, Fall Risk        Position Activity Restriction  Spinal Precautions: No Bending, No Lifting, No Twisting  Other position/activity restrictions: 2 incisions on lower back, 1 incision on lower stomach    Subjective: Pt met in dept after PT session, agreeable to OT this PM.     Objective:   Assisted pt with donning CHANA hose, pt able to don L shoe using the long shoehorn and reacher, however, required touching assist to adjust the tongue of R shoe. Pt amb into bathroom using RW and SBA, completing toilet transfer to comfort height toilet and TSF with SBA. Completed tub transfer from RW with, stepping sideways into the tub while holding onto TSF and grab bar in shower, sitting in shower chair with back. Pt reports she has a vanity where TSF is to hold onto, working on obtaining grab bars in her shower.  Anticipate pt will be safe to step in/out of the tub, as she has no LOB while completing this transfer. Pt completing car transfer from  with SBA, sitting on car seat before turning 90 degrees to place feet in car, pt with good application of spinal precautions during car transfer. UE ex- to increase strength and activity tolerance for ADL tasks, pt completed x10 each exercise (horizontal abd/add, elbow flex/ext, sup/pro, wrist flex/ext) holding 3 lb wrist weight on BUE, completing pro/sup and wrist flex/ext AROM with no     Assessment: Pt making good progress towards goals, completing functional mobility and transfers with SBA. Pt  coming in tomorrow, Thursday for family education. Anticipate pt will discharge Friday home with  and home OT. Safety Device - Type of devices:  []  All fall risk precautions in place [] Bed alarm in place  [] Call light within reach [] Chair alarm in place [] Positioning belt [x] Gait belt [] Patient at risk for falls [] Left in bed [] Left in chair [] Telesitter in use [] Sitter present [] Nurse notified []  None      Therapy Time   Individual Co-treatment   Time In 5361     Time Out 1600     Minutes 45       Electronically signed by Claudette Felling, S/OT on 11/16/2022 at 3:52 PM     Therapist was present, directed the patient's care, made skilled judgement, and was responsible for assessment and treatment of the patient.      Marry Fothergill, OTR/L  #[phone] 11/16/22 4:50 PM

## 2022-11-17 ENCOUNTER — APPOINTMENT (OUTPATIENT)
Dept: GENERAL RADIOLOGY | Age: 72
DRG: 561 | End: 2022-11-17
Attending: PHYSICAL MEDICINE & REHABILITATION
Payer: MEDICARE

## 2022-11-17 LAB
ANION GAP SERPL CALCULATED.3IONS-SCNC: 10 MMOL/L (ref 3–16)
BUN BLDV-MCNC: 12 MG/DL (ref 7–20)
CALCIUM SERPL-MCNC: 9.3 MG/DL (ref 8.3–10.6)
CHLORIDE BLD-SCNC: 103 MMOL/L (ref 99–110)
CO2: 28 MMOL/L (ref 21–32)
CREAT SERPL-MCNC: 0.8 MG/DL (ref 0.6–1.2)
GFR SERPL CREATININE-BSD FRML MDRD: >60 ML/MIN/{1.73_M2}
GLUCOSE BLD-MCNC: 98 MG/DL (ref 70–99)
HCT VFR BLD CALC: 29 % (ref 36–48)
HEMOGLOBIN: 9.7 G/DL (ref 12–16)
MCH RBC QN AUTO: 30 PG (ref 26–34)
MCHC RBC AUTO-ENTMCNC: 33.5 G/DL (ref 31–36)
MCV RBC AUTO: 89.6 FL (ref 80–100)
PDW BLD-RTO: 15 % (ref 12.4–15.4)
PLATELET # BLD: 528 K/UL (ref 135–450)
PMV BLD AUTO: 7.2 FL (ref 5–10.5)
POTASSIUM SERPL-SCNC: 4.2 MMOL/L (ref 3.5–5.1)
RBC # BLD: 3.24 M/UL (ref 4–5.2)
SODIUM BLD-SCNC: 141 MMOL/L (ref 136–145)
WBC # BLD: 10.4 K/UL (ref 4–11)

## 2022-11-17 PROCEDURE — 97535 SELF CARE MNGMENT TRAINING: CPT

## 2022-11-17 PROCEDURE — 97530 THERAPEUTIC ACTIVITIES: CPT

## 2022-11-17 PROCEDURE — 36415 COLL VENOUS BLD VENIPUNCTURE: CPT

## 2022-11-17 PROCEDURE — 6370000000 HC RX 637 (ALT 250 FOR IP): Performed by: PHYSICAL MEDICINE & REHABILITATION

## 2022-11-17 PROCEDURE — 97116 GAIT TRAINING THERAPY: CPT | Performed by: PHYSICAL THERAPIST

## 2022-11-17 PROCEDURE — 80048 BASIC METABOLIC PNL TOTAL CA: CPT

## 2022-11-17 PROCEDURE — 6360000002 HC RX W HCPCS: Performed by: PHYSICAL MEDICINE & REHABILITATION

## 2022-11-17 PROCEDURE — 1280000000 HC REHAB R&B

## 2022-11-17 PROCEDURE — 97530 THERAPEUTIC ACTIVITIES: CPT | Performed by: PHYSICAL THERAPIST

## 2022-11-17 PROCEDURE — 72100 X-RAY EXAM L-S SPINE 2/3 VWS: CPT

## 2022-11-17 PROCEDURE — 85027 COMPLETE CBC AUTOMATED: CPT

## 2022-11-17 RX ORDER — DIAZEPAM 5 MG/1
5 TABLET ORAL EVERY 12 HOURS PRN
Qty: 6 TABLET | Refills: 0 | Status: SHIPPED | OUTPATIENT
Start: 2022-11-17 | End: 2022-11-20

## 2022-11-17 RX ORDER — LISINOPRIL 40 MG/1
40 TABLET ORAL DAILY
Qty: 30 TABLET | Refills: 0 | Status: SHIPPED
Start: 2022-11-17

## 2022-11-17 RX ORDER — HYDROCODONE BITARTRATE AND ACETAMINOPHEN 5; 325 MG/1; MG/1
1 TABLET ORAL EVERY 6 HOURS PRN
Qty: 25 TABLET | Refills: 0 | Status: SHIPPED | OUTPATIENT
Start: 2022-11-17 | End: 2022-11-24

## 2022-11-17 RX ORDER — ONDANSETRON 4 MG/1
4 TABLET, ORALLY DISINTEGRATING ORAL EVERY 8 HOURS PRN
Qty: 9 TABLET | Refills: 0 | Status: SHIPPED | OUTPATIENT
Start: 2022-11-17 | End: 2022-11-20

## 2022-11-17 RX ADMIN — POLYVINYL ALCOHOL 1 DROP: 14 SOLUTION/ DROPS OPHTHALMIC at 21:07

## 2022-11-17 RX ADMIN — Medication 1 CAPSULE: at 07:59

## 2022-11-17 RX ADMIN — POLYVINYL ALCOHOL 1 DROP: 14 SOLUTION/ DROPS OPHTHALMIC at 08:00

## 2022-11-17 RX ADMIN — DIAZEPAM 5 MG: 5 TABLET ORAL at 12:14

## 2022-11-17 RX ADMIN — Medication 400 MG: at 21:07

## 2022-11-17 RX ADMIN — Medication 400 MG: at 07:58

## 2022-11-17 RX ADMIN — DIPHENHYDRAMINE HCL 25 MG: 25 TABLET ORAL at 21:07

## 2022-11-17 RX ADMIN — ONDANSETRON HYDROCHLORIDE 4 MG: 4 TABLET, FILM COATED ORAL at 08:03

## 2022-11-17 RX ADMIN — Medication 2000 UNITS: at 07:59

## 2022-11-17 RX ADMIN — Medication 1 CAPSULE: at 07:58

## 2022-11-17 RX ADMIN — DIAZEPAM 5 MG: 5 TABLET ORAL at 21:07

## 2022-11-17 RX ADMIN — ENOXAPARIN SODIUM 40 MG: 100 INJECTION SUBCUTANEOUS at 21:07

## 2022-11-17 RX ADMIN — Medication 1 TABLET: at 07:58

## 2022-11-17 RX ADMIN — HYDROCODONE BITARTRATE AND ACETAMINOPHEN 1 TABLET: 5; 325 TABLET ORAL at 21:08

## 2022-11-17 RX ADMIN — ATORVASTATIN CALCIUM 10 MG: 10 TABLET, FILM COATED ORAL at 21:07

## 2022-11-17 RX ADMIN — HYDROCODONE BITARTRATE AND ACETAMINOPHEN 2 TABLET: 5; 325 TABLET ORAL at 07:59

## 2022-11-17 RX ADMIN — LISINOPRIL 40 MG: 40 TABLET ORAL at 07:58

## 2022-11-17 RX ADMIN — PANTOPRAZOLE SODIUM 40 MG: 40 TABLET, DELAYED RELEASE ORAL at 06:16

## 2022-11-17 RX ADMIN — METOPROLOL SUCCINATE 25 MG: 25 TABLET, EXTENDED RELEASE ORAL at 07:59

## 2022-11-17 ASSESSMENT — PAIN DESCRIPTION - FREQUENCY
FREQUENCY: CONTINUOUS
FREQUENCY: CONTINUOUS

## 2022-11-17 ASSESSMENT — PAIN DESCRIPTION - ORIENTATION
ORIENTATION: LOWER
ORIENTATION: LEFT

## 2022-11-17 ASSESSMENT — PAIN DESCRIPTION - PAIN TYPE
TYPE: ACUTE PAIN;SURGICAL PAIN;CHRONIC PAIN
TYPE: ACUTE PAIN;SURGICAL PAIN

## 2022-11-17 ASSESSMENT — PAIN DESCRIPTION - LOCATION
LOCATION: BACK
LOCATION: BACK;LEG

## 2022-11-17 ASSESSMENT — PAIN DESCRIPTION - ONSET
ONSET: ON-GOING
ONSET: ON-GOING

## 2022-11-17 ASSESSMENT — PAIN SCALES - GENERAL
PAINLEVEL_OUTOF10: 0
PAINLEVEL_OUTOF10: 7
PAINLEVEL_OUTOF10: 6

## 2022-11-17 ASSESSMENT — PAIN DESCRIPTION - DESCRIPTORS
DESCRIPTORS: ACHING
DESCRIPTORS: ACHING;DISCOMFORT

## 2022-11-17 NOTE — PROGRESS NOTES
Physical Therapy  Facility/Department: [address] [facility]  Rehabilitation Physical Therapy Treatment Note  - AM and PM Sessions      NAME: Patel Thomas  : 1950 (67 y.o.)  MRN: [id number]  CODE STATUS: Full Code    Date of Service: 22       Restrictions:  Restrictions/Precautions: General Precautions; Fall Risk  Position Activity Restriction  Spinal Precautions: No Bending; No Lifting; No Twisting  Other position/activity restrictions: 2 incisions on lower back, 1 incision on lower stomach     SUBJECTIVE  Subjective  Subjective: Patient is sitting in w/c in therapy gym. Patient complains of lower back pain at 6/10 - more soreness miriam with movement. She is alert and pleasant. She is agreeable to PT this morning. Reports no concerns with upcoming discharge to home with spouse tomorrow, plans on family ed with spouse this afternoon. Pain: Has little bit of pain in L leg ~5/10 and complains of lower back pain at 6/10        Post Treatment Pain Screening - continues with pain which she attributes to emesis after taking pain meds earlier this morning      OBJECTIVE  Orientation  Overall Orientation Status: Within Normal Limits  Orientation Level: Oriented X4    Functional Mobility  Bed Mobility  Overall Assistance Level: Independent  Additional Factors:  (flat standard bed in ADL apartment)  Roll Left  Assistance Level: Independent  Roll Right  Assistance Level: Independent  Sit to Supine  Assistance Level: Independent  Supine to Sit  Assistance Level: Independent  Skilled Clinical Factors: nauseated with bed mobility - given ample time to rest between movements  Scooting  Assistance Level: Independent  Balance  Sitting Balance: Independent  Standing Balance: Independent  Standing Balance  Activity: stood with wheeled walker for UE support  Comments: Able to use reacher with walker for UE support and retreive object from floor with modified independence. Transfers  Surface:  To chair with arms;From chair with arms  Device: Walker  Sit to Stand  Assistance Level: Independent  Stand to Sit  Assistance Level: Independent  Skilled Clinical Factors: occasionally forgets optimal hand placement but not necessarily unsafe  Bed To/From Chair  Technique: Stand pivot  Assistance Level: Independent  Skilled Clinical Factors: with wh walker, occasional sets walker to side with is not preferred but not necessarily unsafe  Car Transfer  Assistance Level: Supervision  Skilled Clinical Factors: Patient able verbally descirbe correct technique to approach and sit in mock car, but then after backing up to car patient initially tried to place foot into car and needed to be stopped and reminded that she was not using the correct technique, then she was able to problem solve need to sit first and then place feet into car - supervision for safety cues.     Environmental Mobility  Ambulation  Surface: Level surface  Device: Rolling walker  Distance: 150' with several turns and over threshold, several shorter walks to access curb, steps and mock car, 60' on carpet with several turns around tables  Activity: Within Unit  Activity Comments: less pain L LE but complaining of increased back pain, miriam since she vomitted this morning after taking her pain medication  Assistance Level: Modified independent  Gait Deviations: Slow quan;Decreased step length bilateral;Decreased weight shift bilateral;Decreased arm swing bilateral;Decreased trunk rotation  Skilled Clinical Factors: fairly automatic  Stairs  Stair Height: 6''  Number of Stairs: 12  Additional Factors: Reciprocal going up;Non-reciprocal going down  Assistance Level: Supervision  Skilled Clinical Factors: cues to use only 1 rail to simulate home environment, guarded due to increased back pain but able to complete  Curb  Curb Height: 6''  Device: Rolling walker  Number of Curbs: 1  Additional Factors: Non-reciprocal going up;Non-reciprocal going down  Assistance Level: Stand by assist;Supervision  Skilled Clinical Factors: complained of increased back pain with ascending, needed seated rest after curb and before steps due to back pain                    ASSESSMENT/PROGRESS TOWARDS GOALS       Assessment  Assessment: Patient with increased back pain today which she blames on emesis after taking pain meds, therefore no working medication for her surgical pain. Patient has met her goals and is safe to return home with spouse. Patient was indedendent with bed mobility, transfers, and ambulation with wh walker for community distances despite pain. She seemed distracted by her pain and did need occasional cues for walker safety and technique with car transfer. patient denies as specific concerns with anticipated d/c 11/18 to home w assistance from family. Spouse to attend PT session this afternoon.   Activity Tolerance: Patient tolerated treatment well (nausea/vomiting in the shower earlier today with OT)  Discharge Recommendations: Home with assist PRN;Home with Home health PT;Patient would benefit from continued therapy after discharge  PT Equipment Recommendations  Equipment Needed: No  Other: has wheeled walker    Goals  Patient Goals   Patient Goals : Patient's goal is to go home and take care of herself  Short Term Goals  Time Frame for Short Term Goals: 7-10 days  Short Term Goal 1: transfer with MI - met - 11/17/22  Short Term Goal 2: Bed mobility with MI  - met - 11/17/22  Short Term Goal 3: ambulate with wheeled walker 150' with MI  - met - 11/17/22  Short Term Goal 4: ascend/descend curb step with wheeled walker with SBA  - met - 11/17/22  Long Term Goals  Time Frame for Long Term Goals : STG= LTG    PLAN OF CARE/SAFETY  Physcial Therapy Plan  General Plan:  minutes of therapy at least 5 out of 7 days a week  Days Per Week: 5 Days  Hours Per Day: 1.5 hours  Therapy Duration: 10 Days (7-10 days)  Specific Instructions for Next Treatment: family ed with spouse 11/17 PM in preparation for discharge to home 11/18  Current Treatment Recommendations: Strengthening;Balance training;Functional mobility training;Transfer training;ADL/Self-care training; Endurance training;Gait training;Stair training;Pain management;Home exercise program;Safety education & training;Patient/Caregiver education & training;Equipment evaluation, education, & procurement;Positioning; Therapeutic activities  Additional Comments: dc to home wtih spouse on 11/18  Safety Devices  Type of Devices: Patient at risk for falls; All fall risk precautions in place;Gait belt;Left in chair;Nurse notified    EDUCATION  Education  Education Given To: Patient  Education Provided: Role of Therapy;Plan of Care;Precautions; Safety;ADL Function;Mobility Training;Transfer Training;Home Exercise Program;Family Education;Equipment  Education Provided Comments: car transfer, walker safety  Education Method: Demonstration;Verbal;Teach Back  Barriers to Learning: None  Education Outcome: Verbalized understanding;Continued education needed;Demonstrated understanding    Therapy Time   Individual Concurrent Group Co-treatment   Time In 0945         Time Out 1030         Minutes 45           Timed Code Treatment Minutes: [address] PT, #[id number] 11/17/22 at 12:52 PM     Second Session  Patient to PT after OT session. Spouse, Juan Carlos Briceño, present for family education in preparation for discharge to home tomorrow. No specific questions or concerns with upcoming discharge. Patient reports taking muscle relaxer about 1PM which has brought pain down to ~5/10 in lower back, but not into her leg. Transported to ortho gym via [facility]. Sit to stand with modified independence. Ambulated several short distances with several turns to access curbs, steps, and mock car using  walker and modified independence.    Ascend/descend 6\" then 4\" curbs with  walker and supervision/SBA x 2 reps each - first with PT demonstrating guarding techniques to spouse and second with spouse providing cues and assist.   Transfer to mock car with wh walker - first attempt with PT needed to correct patient to prevent her from lifting her foot backward into car but once stopped realized that she needs to sit down first.  During second attempt with spouse, patient recalled proper technique but needed cues for optimal/preferred hand placement when exiting car. Ascended/descended 12 steps with one rail on R and SBA/supervision, reciprocal ascending and nonreciprocal descending with first attempt and PT providing guidance. Second attempt 8 steps with spouse who corrected to use L rail ascending which was more difficult for patient and required close SBA  for first 4 steps and SBA/supervision of spouse for second 4 steps. Ambulated 200' with wh walker and modified independence. Returned to room after ambulating and wished to lie down in bed. Sit to supine with mod I. All needs met, call light in reach and spouse in patient's room.      Second Session Therapy Time   Individual Co-treatment   Time In 4942     Time Out 1430     Minutes 45        Electronically signed by Sae Porter, PT #[id number] on 11/17/2022 at 2:33 PM

## 2022-11-17 NOTE — PROGRESS NOTES
Department of Physical Medicine & Rehabilitation  Progress Note    Patient Identification:  Clara Dorantes  [id number]  : 1950  Admit date: 11/10/2022    Chief Complaint: Fusion of spine of lumbar region    Subjective:   No acute events overnight. Patient seen this afternoon sitting up in gym. She reports feeling well and looking forward to discharge home tomorrow. We discussed plans for home care, medications, and follow-ups. Hsuband present for our conversation. Labs reviewed. ROS: No f/c, n/v, cp     Objective:  Patient Vitals for the past 24 hrs:   BP Temp Temp src Pulse Resp SpO2 Height Weight   22 0758 (!) 164/99 -- -- (!) 111 -- -- -- --   22 0337 92/60 98.1 °F (36.7 °C) Oral 80 17 94 % 5' 1\" (1.549 m) 139 lb 8.8 oz (63.3 kg)   22 -- -- -- -- 16 -- -- --   22 -- -- -- -- 16 -- -- --   22 1426 -- -- -- -- 16 -- -- --   22 1358 (!) 157/74 97.7 °F (36.5 °C) Oral 93 18 93 % -- --     Const: Alert. No distress, pleasant. HEENT: Normocephalic, atraumatic. Normal sclera/conjunctiva. MMM. CV: Regular rate and rhythm. Resp: No respiratory distress. Lungs CTAB. Abd: Soft, nontender, nondistended, NABS+   Ext: No edema. MSK: decreased spine ROM  Neuro: Alert, oriented, appropriately interactive. Psych: Cooperative, appropriate mood and affect    Laboratory data: Available via EMR.    Last 24 hour lab  Recent Results (from the past 24 hour(s))   Basic Metabolic Panel    Collection Time: 22  5:50 AM   Result Value Ref Range    Sodium 141 136 - 145 mmol/L    Potassium 4.2 3.5 - 5.1 mmol/L    Chloride 103 99 - 110 mmol/L    CO2 28 21 - 32 mmol/L    Anion Gap 10 3 - 16    Glucose 98 70 - 99 mg/dL    BUN 12 7 - 20 mg/dL    Creatinine 0.8 0.6 - 1.2 mg/dL    Est, Glom Filt Rate >60 >60    Calcium 9.3 8.3 - 10.6 mg/dL   CBC    Collection Time: 22  5:50 AM   Result Value Ref Range    WBC 10.4 4.0 - 11.0 K/uL    RBC 3.24 (L) 4.00 - 5.20 M/uL Hemoglobin 9.7 (L) 12.0 - 16.0 g/dL    Hematocrit 29.0 (L) 36.0 - 48.0 %    MCV 89.6 80.0 - 100.0 fL    MCH 30.0 26.0 - 34.0 pg    MCHC 33.5 31.0 - 36.0 g/dL    RDW 15.0 12.4 - 15.4 %    Platelets 165 (H) 650 - 450 K/uL    MPV 7.2 5.0 - 10.5 fL         Therapy progress:  Physical therapy:  Bed Mobility:  Overall Assistance Level: Stand By Assist  Additional Factors: Increased time to complete, Head of bed flat, Without handrails  Sit>supine:  Assistance Level: Stand by assist (for L LE)  Skilled Clinical Factors: no assist for LEs, slow due to pain  Supine>sit:  Assistance Level: Minimal assistance  Skilled Clinical Factors: HOB elevated slightly, use of R bed rail  Transfers:  Surface:  To chair with arms, From chair with arms  Additional Factors: Verbal cues, Increased time to complete  Device: Walker  Sit>stand:  Assistance Level: Stand by assist  Skilled Clinical Factors: verbal cues for hand placement  Stand>sit:  Assistance Level: Supervision  Skilled Clinical Factors: verbal cues for hand placement, forgets to reach back for arm rests of chair, cues for upright posture  Bed<>chair  Technique: Stand pivot  Assistance Level: Stand by assist (with walker)  Stand Pivot:     Lateral transfer:     Car transfer:     Ambulation:  Surface: Level surface  Device: Rolling walker  Distance: 225' x 2  Activity: Within Unit  Activity Comments: less pain L LE  Additional Factors: Verbal cues, Increased time to complete  Assistance Level: Supervision  Gait Deviations: Slow quan, Decreased step length bilateral, Decreased weight shift bilateral, Decreased arm swing bilateral, Decreased trunk rotation  Skilled Clinical Factors: verbal cues to keep eyes/head up and look forward stand closer to the walker and to land with heel vs forefoot  Stairs:  Stair Height: 6''  Device: Rolling walker  Number of Stairs: 12  Additional Factors: Reciprocal going up, Reciprocal going down  Assistance Level: Contact guard assist  Skilled Clinical Factors: cues for sequencing and cues to get entire foot onto the step, Pt. modified appropriately  Curb:  Curb Height: 6''  Device: Rolling walker  Number of Curbs: 2  Additional Factors: Verbal cues, Non-reciprocal going up, Non-reciprocal going down  Assistance Level: Stand by assist  Skilled Clinical Factors: cued to step farther into walker to make lifting the walker easier. Wheelchair:     Assessment:  Assessment: pt was able to complete all exercises this session. she was able to complete 2 community level distance walks w FWW and supervision x 1. pt was also able to complete 2 curb steps today w SBA x 1. v/c are still required for safe transfers and functional mobility tasks. pt is limited by pain, decreased endurance, impaired gait, and impaired functional mobility. She will continue to benefit from skilled PT to improve her strength, endurance, gait, and functional mobility to facilitate a safe return to home w independent ADL's. plan for d/c set for 11/18 to home w assistance from family. Activity Tolerance: Patient limited by fatigue, Patient limited by pain (mod pain(5); c/o fatigue)  Discharge Recommendations: Home with assist PRN, Home with Home health PT, Patient would benefit from continued therapy after discharge      Occupational therapy:   Feeding  Assistance Level: Independent  Grooming/Oral Hygiene  Assistance Level: Independent  Skilled Clinical Factors: pt completed oral hygiene while standing at sink in bathroom  UE Bathing  Assistance Level:  Independent  Skilled Clinical Factors: washed UEs, chest and abdomen while seated on shower chair  LE Bathing  Equipment Provided: Long-handled sponge  Assistance Level: Modified independent  Skilled Clinical Factors: washed thighs with wash cloth, long sponge to reach feet, standing with grab bars and sup to wash buttocks/periarea, touching assist to thoroughly clean BM residue from buttocks, pt able to dry all areas  UE Dressing  Assistance Level: Independent  Skilled Clinical Factors: pt donned bra in front, twisting to back to place straps, verbal cues to maintain precautions while turning bra, donning long sleeve shirt overhead while seated  LE Dressing  Equipment Provided: Reachers  Assistance Level: Modified independent  Skilled Clinical Factors: pt able to thread BLE through underpants using reacher with increased time, verbal cues for sequence, crossing RLE over LLE to thread R foot first, using reacher to thread LLE, able to thread BLE through pants using reacher with increased time, able to stand and manage over hips with sup, initial pair of pants were too small, SOT assisted with changing of pants d/t time constraints  Putting On/Taking Off Footwear  Equipment Provided:  (long shoehorn)  Assistance Level: Modified independent  Skilled Clinical Factors: pt required assist to don CHANA hose d/t time constraints, able to thread B shoes using shoehorn with touching assist to manage tongue of shoe, will educate pt on using reacher to adjust tongue while donning shoes  Toileting  Assistance Level: Independent  Skilled Clinical Factors: Voided urine on commode in her BR. Pt managed hygiene and clothes. Assessment:  Assessment: Pt tolerated tx fairly well, limited by decreased activity tolerance/endurance. Pt has made progress towards goals, completing transfers with Supervision and ADL toileting task with Supervision. Pt Supervision for bed mob, sit>supine, yet cued for regarding spinal precautions. Pt completed IADL tasks with Supervision (made oatmeal in microwave and gathered clothes from closet) with Supervision and min cues for safe walker use. Cont poc and plan for d/c home this Friday with [address] [place] [address].   Activity Tolerance: Patient limited by fatigue, Patient limited by pain (mod pain(5); c/o fatigue)  Discharge Recommendations: Continue to assess pending progress, Home with assist PRN, Patient would benefit from continued therapy after discharge, Home with Home health OT    Speech therapy:            PT  Position Activity Restriction  Spinal Precautions: No Bending, No Lifting, No Twisting  Other position/activity restrictions: 2 incisions on lower back, 1 incision on lower stomach  Objective     Sit to Stand: Contact guard assistance (cues for hand placement)  Stand to Sit: Contact guard assistance (cues for hand placement)  Bed to Chair: Contact guard assistance (cues for safety with wheeled walker)  Device: Rolling Walker  Assistance: Contact guard assistance  Distance: 61' with several turns( 126' in PM)  OT  PT Equipment Recommendations  Equipment Needed: No  Other: has wheeled walker  Toilet - Technique: Ambulating  Equipment Used: Standard toilet  Assessment        SLP          Body mass index is 26.37 kg/m². Assessment and Plan:    Lumbar stenosis with neurogenic claudication  -s/p  L4-S1 anterior lumbar interbody fusion with pedicle screw fixation and laminectomy and decompression (11/3 with Dr. Tiny De Leon)  -F/u xrays 11/17 stable  -PT/OT    HTN  -lisinopril, metoprolol    HLD  -atorvastatin    Bladder  -High risk retention  -Monitor PVRs, straight cath prn >300    Bowel  -Constipation resolve,d now with loose stools. -Bowel regimen prn    Pain control  -Norco prn    Ppx  -lovenox, ppi    Rehab Progress: Making progress. Working on functional mobility, balance, compensatory strategies. Limited by spinal precautions. Anticipated Dispo: home with   Services: HH Pt, OT, RN  DME: shower chair, TSF, shoe horn, 1/2 bed rail  ELOS: 11/18      [address]  Edwardo Cid MD 11/17/2022, 9:59 AM

## 2022-11-17 NOTE — PROGRESS NOTES
Comprehensive Nutrition Assessment    Type and Reason for Visit:  Reassess    Nutrition Recommendations/Plan:   Continue regular diet     Malnutrition Assessment:  Malnutrition Status: At risk for malnutrition (Comment) (11/11/22 141)    Context:  Acute Illness       Nutrition Assessment:    Follow up. Pt was seen in room and reports good appetite and intake. SHe said she previuosly had nausea and vomitting but has since began eating slower and lighter and she has not had any issues. No nutrition intervention needed at this time    Nutrition Related Findings:    no edema. nutrition related labs normal Wound Type: Surgical Incision       Current Nutrition Intake & Therapies:    Average Meal Intake: 51-75%  Average Supplements Intake: None Ordered  ADULT DIET; Regular    Anthropometric Measures:  Height: 5' 1\" (154.9 cm)  Ideal Body Weight (IBW): 105 lbs (48 kg)    Admission Body Weight: 139 lb 15.9 oz (63.5 kg)  Current Body Weight: 143 lb 8.3 oz (65.1 kg), 136.7 % IBW. Weight Source: Bed Scale  Current BMI (kg/m2): 27.1        Weight Adjustment For: No Adjustment                 BMI Categories: Overweight (BMI 25.0-29. 9)    Estimated Daily Nutrient Needs:        Energy (kcal/day): 7887-8341 (20-25 x CBW 65)     Protein (g/day): 62-72 ( 1.3-1.5 x IBW 48)     Fluid (ml/day): per provider    Nutrition Diagnosis:   Predicted inadequate energy intake related to altered GI function as evidenced by nausea, vomiting    Nutrition Interventions:   Food and/or Nutrient Delivery: Continue Current Diet  Nutrition Education/Counseling: Education not indicated  Coordination of Nutrition Care: Continue to monitor while inpatient       Goals:     Goals: Meet at least 75% of estimated needs, by next RD assessment       Nutrition Monitoring and Evaluation:   Behavioral-Environmental Outcomes: None Identified  Food/Nutrient Intake Outcomes: Food and Nutrient Intake  Physical Signs/Symptoms Outcomes: Biochemical Data, Weight, Nutrition Focused Physical Findings, Nausea or Vomiting    Discharge Planning:    No discharge needs at this time     Adarsh Grant, [address] [place] [address]  Contact: [phone]

## 2022-11-17 NOTE — PROGRESS NOTES
Occupational Therapy  Facility/Department: [facility]  Rehabilitation Occupational Therapy Daily Treatment Note/Discharge Summary    Date: 22  Patient Name: John Julio       Room: Y7U-3876/8700-64  MRN: [id number]  Account: [de-identified]   : 1950  (73 y.o.) Gender: female                    Past Medical History:  has a past medical history of Hypertension and PONV (postoperative nausea and vomiting). Past Surgical History:   has a past surgical history that includes Tonsillectomy; Tubal ligation; and cyst removal.    Restrictions  Restrictions/Precautions: General Precautions; Fall Risk  Other position/activity restrictions: 2 incisions on lower back, 1 incision on lower stomach    Subjective  Subjective: pt met bedside in room, nursing present to administer pain medication, pt reported nausea this morning, however, agreeable to OT this AM  Restrictions/Precautions: General Precautions; Fall Risk             Objective     Orientation  Overall Orientation Status: Within Normal Limits  Orientation Level: Oriented X4         ADL  Grooming/Oral Hygiene  Assistance Level: Independent  Skilled Clinical Factors: pt completed oral hygiene while standing at sink in bathroom  Upper Extremity Bathing  Assistance Level: Independent  Skilled Clinical Factors: washed UEs, chest and abdomen while seated on shower chair, pt with increased nausea in the showering, vomiting x1, reported she \"felt better to get it out\", no other c/o nausea throughout session  Lower Extremity Bathing  Assistance Level: Modified independent  Skilled Clinical Factors: washed thighs with wash cloth, long sponge to reach feet, standing with grab bars to wash buttocks/periarea, pt able to dry all areas  Upper Extremity Dressing  Assistance Level:  Independent  Skilled Clinical Factors: gathered clothing, donning bra in front, twisting to back to thread arms through straps, donning sweatshirt over head while seated on shower chair  Lower Extremity Dressing  Equipment Provided: Reachers  Assistance Level: Modified independent  Skilled Clinical Factors: pt threading BLE through underpants using reacher and increased time, able to don BLE through pants with reacher, standing with grab bars to manage over hips  Putting On/Taking Off Footwear  Equipment Provided: Sock aid (long shoehorn)  Assistance Level: Minimal assistance  Skilled Clinical Factors: pt able to cross RLE over LLE to don R sock, able to cross LLE over RLE but difficulty with donning L sock, opted to use sock aid to don L sock, assist to don CHANA hose, pt able to don B shoes using reacher and long shoehorn  Toileting  Assistance Level: Independent  Skilled Clinical Factors: voided urine x2 throughout session, pt able to manage clothing down and up over hips, able to complete toilet hygiene while seated  Toilet Transfers  Technique:  (ambulating)  Equipment: Standard toilet;Grab bars  Assistance Level: Modified independent  Skilled Clinical Factors: to/from RW  Tub/Shower Transfers  Type: Shower  Transfer From: Rolling walker  Transfer To: Shower chair with back  Assistance Level: Modified independent          Functional Mobility  Device: Rolling walker  Activity: Retrieve items;Transport items; To/From bathroom  Assistance Level: Modified independent  Skilled Clinical Factors: pt with good safety awareness and application of spinal precautions  Transfers  Surface: To chair with arms; To chair without arms;From chair with arms;From chair without arms;Standard toilet  Device: Walker  Sit to Stand  Assistance Level: Modified independent  Stand to Sit  Assistance Level: Modified independent         Assessment  Assessment  Assessment: Pt has met 5/6 STGs, will complete IADL task this PM, anticipate pt ronda meet 6/6 STGs.  Pt is able to complete functional mobility and transfers indep with RW, bathing indep, UB dressing indep, requires assist to don CHANA hose but is otherwise indep with LB dressing and footwear. Pt has made significant process towards her goals and independence while on the rehab unit. Pt is able to recall and apply spinal precautions well during ADL tasks. Anticipate pt will discharge home with assist from her  PRN tomorrow, Friday, with home health OT. Pt will require long shoehorn, shower chair with back, TSF, half bed rail, and grab bars in the shower. Activity Tolerance: Patient tolerated treatment well (nausea/vomiting in the shower)  Discharge Recommendations: Home with assist PRN;Patient would benefit from continued therapy after discharge;Home with Home health OT  OT Equipment Recommendations  Equipment Needed: Yes  Mobility Devices: ADL Assistive Devices  ADL Assistive Devices: Long-handled Shoe Horn;Shower Chair with back; Toilet Safety Frame;Grab Bars - shower  Other: Half bed rail  Safety Devices  Safety Devices in place: Yes  Type of devices: All fall risk precautions in place;Gait belt;Call light within reach; Chair alarm in place; Left in chair    PM Session     Pt met in room,  \"Reagan\" present for family education, pt agreeable to OT this PM.     Pt sit stand from EOB to RW indep, amb to wheelchair with mod indep. Transport to dept with SOT and OT. Pt  educated on walking with pt safely, good understanding, pt walking into restroom with . Pt completing tub transfer stepping into the tub sideways holding onto TSF and grab bar mod indep, pt states they have been discussing obtaining a grab bar for their shower, will use vanity where TSF is. Pt completed toilet transfer using TSF with mod indep, pt stated they are going to purchase them for home as well. Anticipate pt is safe to use equipment at home for bathroom transfers, and pt  will be safe to assist her as needed.      Pt completed bed mobility using half bed rail, sitting EOB to supine using log roll technique, using reacher to place blanket over her once laying supine, log roll technique to return to sitting EOB with mod indep. Pt stated they are going to purchase a half bed rail for her home set up. Pt completed simple meal prep from RW using walker basket, able to gather items from the fridge, educated pt on using reacher to open overhead cabinets and retrieve light objects with. Pt  aware of spinal precautions and demonstrated good understanding of application of precautions while completing tasks. Pt completing simple meal prep with mod indep, anticipate she will be safe to complete meal prep at home using AE as needed and with assist from her spouse. Pt has met 6/6 STGs. Pt  was educated on pt abilities and where she may need assistance. Good safety awareness and understanding from . Anticipate pt is safe to discharge tomorrow, Sat, to her home with home OT. Pt to PT session at end of OT session. Patient Education  Education  Education Given To: Patient  Education Provided: ADL Function;Mobility Training;Transfer Training  Education Method: Demonstration;Verbal;Teach Back  Barriers to Learning: None  Education Outcome: Verbalized understanding;Demonstrated understanding;Continued education needed    Plan  Occupational Therapy Plan  Times Per Week: 5-6  Times Per Day: Twice a day  Days Per Week: 5 Days  Hours Per Day: 1.5 hours  Therapy Duration: 10 Days  Current Treatment Recommendations: Strengthening;Balance training;Functional mobility training; Endurance training;Pain management; Safety education & training;Patient/Caregiver education & training;Equipment evaluation, education, & procurement;Self-Care / ADL; Home management training    Goals  Patient Goals   Patient goals : pt stated she wants to start feeling better  Short Term Goals  Time Frame for Short Term Goals: in 7-10 days pt will be. ..   Short Term Goal 1: indep with toileting using grab bars PRN, Goal Met  Short Term Goal 2: indep with bathing using AE to maintain spinal precautions, Goal Met  Short Term Goal 3: indep with UB dressing, Goal Met  Short Term Goal 4: indep with LB dressing (exception of CHANA hose) using AE to maintain spinal precautions, Goal Met  Short Term Goal 5: indep with functional mobility and transfers using LRAD while maintaining spinal precautions, Goal Met  Additional Goals?: Yes  Short Term Goal 6: indep with IADL tasks using LRAD and maintaining spinal precautions, Goal Met                   Therapy Time   Individual Concurrent Group Co-treatment   Time In 0800         Time Out 0900         Minutes 60         Timed Code Treatment Minutes: 60 Minutes     Therapy Time     Individual Co-treatment   Time In 1300     Time Out 1345     Minutes [address], S/OT    Therapist was present, directed the patient's care, made skilled judgement, and was responsible for assessment and treatment of the patient.        Mamadou Paris, NADERR/L  #[id number] 11/17/22 11:38 AM

## 2022-11-17 NOTE — DISCHARGE INSTR - COC
Continuity of Care Form    Patient Name: Amy Slade   :  1950  MRN:  [id number]    Admit date:  11/10/2022  Discharge date:  22    Code Status Order: Full Code   Advance Directives:     Admitting Physician:  Ladonna Cochran MD  PCP: Olman Santos    Discharging Nurse: [facility] Unit/Room#: D1O-9521/6069-40  Discharging Unit Phone Number: [phone]    Emergency Contact:   Extended Emergency Contact Information  Primary Emergency Contact: Reagan Ni  Address: [facility], [address] [place] [address] Phone: [phone]  Relation: Spouse    Past Surgical History:  Past Surgical History:   Procedure Laterality Date    CYST REMOVAL      TONSILLECTOMY      TUBAL LIGATION         Immunization History:   Immunization History   Administered Date(s) Administered    COVID-19, MODERNA BLUE border, Primary or Immunocompromised, (age 12y+), IM, 100 mcg/0.5mL 2021, 2021       Active Problems:  Patient Active Problem List   Diagnosis Code    Spinal stenosis of lumbar region without neurogenic claudication M48.061    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       Isolation/Infection:   Isolation            No Isolation          Patient Infection Status       None to display            Nurse Assessment:  Last Vital Signs: BP (!) 164/99   Pulse (!) 111   Temp 98.1 °F (36.7 °C) (Oral)   Resp 17   Ht 5' 1\" (1.549 m)   Wt 139 lb 8.8 oz (63.3 kg)   SpO2 94%   BMI 26.37 kg/m²     Last documented pain score (0-10 scale): Pain Level: 7  Last Weight:   Wt Readings from Last 1 Encounters:   22 139 lb 8.8 oz (63.3 kg)     Mental Status:  oriented, alert, coherent, logical, thought processes intact, and able to concentrate and follow conversation    IV Access:  - None    Nursing Mobility/ADLs:  Walking   Independent  Transfer  Independent  Bathing  Independent  Dressing  Independent  Toileting  Independent  Feeding  [facility] whole    Wound Care Documentation and Therapy:  Incision Back Lower (Active)   Dressing Status Other (Comment) 11/17/22 0854   Incision Cleansed Not Cleansed 11/17/22 0854   Dressing/Treatment Open to air 11/17/22 0854   Closure Surgical glue 11/17/22 0854   Margins Approximated 11/17/22 0854   Incision Assessment Dry 11/17/22 0854   Drainage Amount None 11/17/22 0854   Odor None 11/17/22 0854   Iva-incision Assessment Intact 11/15/22 1958   Number of days:         Elimination:  Continence: Bowel: Yes  Bladder: Yes  Urinary Catheter: None   Colostomy/Ileostomy/Ileal Conduit: No       Date of Last BM: 11/17/22    Intake/Output Summary (Last 24 hours) at 11/17/2022 1003  Last data filed at 11/16/2022 1303  Gross per 24 hour   Intake 240 ml   Output --   Net 240 ml     I/O last 3 completed shifts: In: 480 [P.O.:480]  Out: -     Safety Concerns:     None    Impairments/Disabilities:      None    Nutrition Therapy:  Current Nutrition Therapy:   - Oral Diet:  General    Routes of Feeding: Oral  Liquids: No Restrictions  Daily Fluid Restriction: no  Last Modified Barium Swallow with Video (Video Swallowing Test): not done    Treatments at the Time of Hospital Discharge:   Respiratory Treatments:   Oxygen Therapy:  is not on home oxygen therapy.   Ventilator:    - No ventilator support    Rehab Therapies: Physical Therapy and Occupational Therapy  Weight Bearing Status/Restrictions: No weight bearing restrictions  Other Medical Equipment (for information only, NOT a DME order):  walker  Other Treatments:     Patient's personal belongings (please select all that are sent with patient):  None    RN SIGNATURE:  Electronically signed by Ambreen Olivas RN on 11/18/22 at 10:25 AM EST    CASE MANAGEMENT/SOCIAL WORK SECTION    Inpatient Status Date: 11-    Readmission Risk Assessment Score:  Readmission Risk              Risk of Unplanned Readmission:  10         Discharging to Facility/ Agency   Name: [facility]  Address:  [address] [place] [address]   Phone:  [phone]  Fax:  [phone]    / signature: Electronically signed by Gillian Thomas on 11/18/22 at 8:49 AM EST    PHYSICIAN SECTION    Prognosis: Good    Condition at Discharge: Stable    Rehab Potential (if transferring to Rehab): Good    Recommended Labs or Other Treatments After Discharge:   Home care PT, OT, RN  Follow-up with PCP and Neurosurgery    Physician Certification: I certify the above information and transfer of Catalina Chakraborty  is necessary for the continuing treatment of the diagnosis listed and that she requires Home Care for less 30 days.      Update Admission H&P: No change in H&P    PHYSICIAN SIGNATURE:  Electronically signed by Nicolasa Che MD on 11/17/22 at 10:03 AM EST

## 2022-11-17 NOTE — PROGRESS NOTES
Acute Pulm Embolism RLL with RV strain increasing Troponin sec to DVT rt Peroneal vein   Pulm Fibrosis ? 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 A/Ox4. Transfers with walker x 1. Mobility restrictions: none. On regular diet, tolerating well. Medications taken whole. On Lovenox for DVT prophylaxis. Skin: has surgical incision on abdomen and back. Oxygen: none. LDA: none. Has been continent of bowel and continent of bladder. LBM 11/15. Chair/bed alarms in use and call light in reach. Will monitor for safety.

## 2022-11-17 NOTE — PLAN OF CARE
Problem: Discharge Planning  Goal: Discharge to home or other facility with appropriate resources  11/17/2022 0930 by Analisa Flower RN  Outcome: Progressing  Flowsheets (Taken 11/15/2022 0730 by Fabrice Villanueva, RN)  Discharge to home or other facility with appropriate resources:   Identify barriers to discharge with patient and caregiver   Arrange for needed discharge resources and transportation as appropriate   Identify discharge learning needs (meds, wound care, etc)   Refer to discharge planning if patient needs post-hospital services based on physician order or complex needs related to functional status, cognitive ability or social support system  11/17/2022 0148 by Zechariah Fischer, DANIELLE  Outcome: Progressing     Problem: Pain  Goal: Verbalizes/displays adequate comfort level or baseline comfort level  11/17/2022 0930 by Analisa Flower RN  Outcome: Progressing  Flowsheets (Taken 11/15/2022 1452 by Fabrice Villanueva, DANIELLE)  Verbalizes/displays adequate comfort level or baseline comfort level:   Encourage patient to monitor pain and request assistance   Assess pain using appropriate pain scale   Administer analgesics based on type and severity of pain and evaluate response   Implement non-pharmacological measures as appropriate and evaluate response  11/17/2022 0148 by Zechariah Fischer RN  Outcome: Progressing

## 2022-11-18 VITALS
DIASTOLIC BLOOD PRESSURE: 86 MMHG | HEIGHT: 61 IN | SYSTOLIC BLOOD PRESSURE: 167 MMHG | RESPIRATION RATE: 17 BRPM | OXYGEN SATURATION: 96 % | HEART RATE: 90 BPM | BODY MASS INDEX: 25.68 KG/M2 | WEIGHT: 136.02 LBS | TEMPERATURE: 98.1 F

## 2022-11-18 PROCEDURE — 6370000000 HC RX 637 (ALT 250 FOR IP): Performed by: PHYSICAL MEDICINE & REHABILITATION

## 2022-11-18 RX ADMIN — Medication 1 CAPSULE: at 09:47

## 2022-11-18 RX ADMIN — Medication 1 TABLET: at 09:47

## 2022-11-18 RX ADMIN — Medication 1 CAPSULE: at 09:48

## 2022-11-18 RX ADMIN — Medication 400 MG: at 09:48

## 2022-11-18 RX ADMIN — HYDROCODONE BITARTRATE AND ACETAMINOPHEN 1 TABLET: 5; 325 TABLET ORAL at 09:46

## 2022-11-18 RX ADMIN — METOPROLOL SUCCINATE 25 MG: 25 TABLET, EXTENDED RELEASE ORAL at 09:47

## 2022-11-18 RX ADMIN — PANTOPRAZOLE SODIUM 40 MG: 40 TABLET, DELAYED RELEASE ORAL at 06:01

## 2022-11-18 RX ADMIN — POLYVINYL ALCOHOL 1 DROP: 14 SOLUTION/ DROPS OPHTHALMIC at 09:49

## 2022-11-18 RX ADMIN — Medication 2000 UNITS: at 09:48

## 2022-11-18 RX ADMIN — LISINOPRIL 40 MG: 40 TABLET ORAL at 09:48

## 2022-11-18 RX ADMIN — DIAZEPAM 5 MG: 5 TABLET ORAL at 09:47

## 2022-11-18 ASSESSMENT — PAIN DESCRIPTION - LOCATION: LOCATION: BACK

## 2022-11-18 ASSESSMENT — PAIN DESCRIPTION - PAIN TYPE: TYPE: ACUTE PAIN;SURGICAL PAIN

## 2022-11-18 ASSESSMENT — PAIN DESCRIPTION - ONSET: ONSET: ON-GOING

## 2022-11-18 ASSESSMENT — PAIN SCALES - GENERAL: PAINLEVEL_OUTOF10: 6

## 2022-11-18 ASSESSMENT — PAIN DESCRIPTION - FREQUENCY: FREQUENCY: CONTINUOUS

## 2022-11-18 ASSESSMENT — PAIN DESCRIPTION - ORIENTATION: ORIENTATION: LOWER

## 2022-11-18 ASSESSMENT — PAIN DESCRIPTION - DESCRIPTORS: DESCRIPTORS: ACHING

## 2022-11-18 ASSESSMENT — PAIN - FUNCTIONAL ASSESSMENT: PAIN_FUNCTIONAL_ASSESSMENT: ACTIVITIES ARE NOT PREVENTED

## 2022-11-18 NOTE — PROGRESS NOTES
Provision of Current Reconciled Medication List to Subsequent Provider at Discharge  []No, current reconciled medication list not provided to the subsequent provider. [x]Yes, current reconciled medication list provided to the subsequent provider. (**Select route of transmission below**)   [x] [address] [place] [address] Drive Exchange Organization  [] Verbal (e.g. in person, telephone, video conferencing)  []Paper-based (e.g. fax, copies, printouts)   []Other Methods (e.g. texting, email, CDs)    Provision of Current Reconciled Medication List to Patient at Discharge  []No, current reconciled medication list not provided to the patient, family and/or caregiver. [x]Yes, current reconciled medication list provided to the patient, family and/or caregiver.  (**Select route of transmission below**)   [] Via Electronic Health Record (e.g., electronic access to patient portal)   [] Via Health Information Exchange Organization  [x] Verbal (e.g. in person, telephone, video conferencing)  [x]Paper-based (e.g. fax, copies, printouts)   []Other Methods (e.g. texting, email, CDs)    High Risk Drug Classes:  Use and Indication    Is taking: Check if the pt is taking any medications by pharmacological classification, not how it is used, in the following classes  Indication noted: If column 1 is checked, check if there is an indication noted for all meds in the drug class Is taking  (check all that apply) Indication noted (check all that apply)   Antipsychotic [] []   Anticoagulant [] []   Antibiotic [] []   Opioid [x] []   Antiplatelet [] []   Hypoglycemic (including insulin) [] []   None of the above []     Special Treatments, Procedures, and Programs    Check all of the following treatments, procedures, and programs that apply at discharge. At Discharge (check all that apply)   Cancer Treatments   A1. Chemotherapy []           A2. IV []           A3. Oral []           A10.  Other []   B1. Radiation [] Respiratory Therapies   C1. Oxygen Therapy []           C2. Continuous []           C3. Intermittent []           C4. High-concentration []   D1. Suctioning []           D2. Scheduled []           D3. As needed []   E1. Tracheostomy Care []   F1. Invasive Mechanical Ventilator (ventilator or respirator) []   G1. Non-invasive Mechanical Ventilator []           G2. BiPAP []           G3. CPAP []   Other   H1. IV Medications []           H2. Vasoactive medications []           H3. Antibiotics []           H4. Anticoagulation []           H10. Other []   I1. Transfusions []   J1. Dialysis []           J2. Hemodialysis []           J3. Peritoneal dialysis []   O1. IV access []           O2. Peripheral []           O3. Midline []           O4.  Central (PICC, tunneled, port) []      None of the above (select if no Cancer, Respiratory, or Other boxes are checked) [x]

## 2022-11-18 NOTE — CARE COORDINATION
11/18/22 0854   IMM Letter   IMM Letter given to Patient/Family/Significant other/Guardian/POA/by: presented to pateint and spouse by sin Willett. They verbalized understanding.    IMM Letter date given: 11/16/22   IMM Letter time given: 6363

## 2022-11-18 NOTE — DISCHARGE SUMMARY
Physical Medicine & Rehabilitation  Discharge Summary     Patient Identification:  Fahad Cagle  : 1950  Admit date: 11/10/2022  Discharge date:  2022  Attending provider: Lyly Monsivais MD        Primary care provider: Lisa Drake     Discharge Diagnoses:   Patient Active Problem List   Diagnosis    Spinal stenosis of lumbar region without neurogenic claudication    Fusion of spine of lumbar region       History of Present Illness/Acute Hospital Course:  Patient is a 51-year-old female with a history of severe lumbar spinal stenosis causing low back and leg pain complaints that did not improve with conservative treatment. Patient was taken to surgery on 11/3 by Dr. Keyanna Aburto at [facility] where she underwent L4-S1 anterior lumbar interbody fusion with pedicle screw fixation and laminectomy and decompression procedure. Patient also has history of hypertension and hyperlipidemia. Patient lives at home with her  in a 1 - level house. Inpatient Rehabilitation Course:   Fahad Cagle is a 67 y.o. female admitted to inpatient rehabilitation on 11/10/2022 with Fusion of spine of lumbar region. The patient participated in an aggressive multidisciplinary inpatient rehabilitation program involving 3 hours of therapy per day, at least 5 days per week. She made good progress with regard to functional mobility, balance, compensatory strategies. Now overall modified independent. Discharging home with . Home care services and DME ordered as below. Patient will follow-up with Neurosurgery and PCP. Medical Management:    Lumbar stenosis with neurogenic claudication  -s/p  L4-S1 anterior lumbar interbody fusion with pedicle screw fixation and laminectomy and decompression (11/3 with Dr. Keyanna Aburto)  -F/u xrays  stable  -PT/OT     HTN  -lisinopril, metoprolol     HLD  -atorvastatin     Pain control  -Norco prn     Ppx  -lovenox, ppi    Discharge Exam:  Const: Alert.  No distress, pleasant. HEENT: Normocephalic, atraumatic. Normal sclera/conjunctiva. MMM. CV: Regular rate and rhythm. Resp: No respiratory distress. Lungs CTAB. Abd: Soft, nontender, nondistended, NABS+   Ext: No edema. MSK: decreased spine ROM  Neuro: Alert, oriented, appropriately interactive. Psych: Cooperative, appropriate mood and affect      Discharge Functional Status:    Physical therapy:  Bed Mobility:  Overall Assistance Level: Independent  Additional Factors:  (flat standard bed in ADL apartment)  Sit>supine:  Assistance Level: Independent  Skilled Clinical Factors: no assist for LEs, slow due to pain  Supine>sit:  Assistance Level: Independent  Skilled Clinical Factors: nauseated with bed mobility - given ample time to rest between movements  Transfers:  Surface: To chair with arms, From chair with arms  Additional Factors: Verbal cues, Increased time to complete  Device: Walker  Sit>stand:  Assistance Level: Independent  Skilled Clinical Factors: verbal cues for hand placement  Stand>sit:  Assistance Level: Independent  Skilled Clinical Factors: occasionally forgets optimal hand placement but not necessarily unsafe  Bed<>chair  Technique: Stand pivot  Assistance Level: Independent  Skilled Clinical Factors: with wh walker, occasional sets walker to side with is not preferred but not necessarily unsafe  Stand Pivot:     Lateral transfer:     Car transfer:  Assistance Level: Supervision  Skilled Clinical Factors: Patient able verbally descirbe correct technique to approach and sit in mock car, but then after backing up to car patient initially tried to place foot into car and needed to be stopped and reminded that she was not using the correct technique, then she was able to problem solve need to sit first and then place feet into car - supervision for safety cues.   Ambulation:  Surface: Level surface  Device: Rolling walker  Distance: 150' with several turns and over threshold, several shorter walks to access curb, steps and mock car, 60' on carpet with several turns around tables  Activity: Within Unit  Activity Comments: less pain L LE but complaining of increased back pain, miriam since she vomitted this morning after taking her pain medication  Additional Factors: Verbal cues, Increased time to complete  Assistance Level: Modified independent  Gait Deviations: Slow quan, Decreased step length bilateral, Decreased weight shift bilateral, Decreased arm swing bilateral, Decreased trunk rotation  Skilled Clinical Factors: fairly automatic  Stairs:  Stair Height: 6''  Device: Rolling walker  Number of Stairs: 12  Additional Factors: Reciprocal going up, Non-reciprocal going down  Assistance Level: Supervision  Skilled Clinical Factors: cues to use only 1 rail to simulate home environment, guarded due to increased back pain but able to complete  Curb:  Curb Height: 6''  Device: Rolling walker  Number of Curbs: 1  Additional Factors: Non-reciprocal going up, Non-reciprocal going down  Assistance Level: Stand by assist, Supervision  Skilled Clinical Factors: complained of increased back pain with ascending, needed seated rest after curb and before steps due to back pain  Wheelchair:     Assessment:  Assessment: Patient with increased back pain today which she blames on emesis after taking pain meds, therefore no working medication for her surgical pain. Patient has met her goals and is safe to return home with spouse. Patient was indedendent with bed mobility, transfers, and ambulation with wh walker for community distances despite pain. She seemed distracted by her pain and did need occasional cues for walker safety and technique with car transfer. patient denies as specific concerns with anticipated d/c 11/18 to home w assistance from family. Spouse to attend PT session this afternoon.   Activity Tolerance: Patient tolerated treatment well (nausea/vomiting in the shower earlier today with OT)  Discharge Recommendations: Home with assist PRN, Home with Home health PT, Patient would benefit from continued therapy after discharge      Occupational therapy:   Feeding  Assistance Level: Independent  Grooming/Oral Hygiene  Assistance Level: Independent  Skilled Clinical Factors: pt completed oral hygiene while standing at sink in bathroom  UE Bathing  Assistance Level: Independent  Skilled Clinical Factors: washed UEs, chest and abdomen while seated on shower chair, pt with increased nausea in the showering, vomiting x1, reported she \"felt better to get it out\", no other c/o nausea throughout session  LE Bathing  Equipment Provided: Long-handled sponge  Assistance Level: Modified independent  Skilled Clinical Factors: washed thighs with wash cloth, long sponge to reach feet, standing with grab bars to wash buttocks/periarea, pt able to dry all areas  UE Dressing  Assistance Level: Independent  Skilled Clinical Factors: gathered clothing, donning bra in front, twisting to back to thread arms through straps, donning sweatshirt over head while seated on shower chair  LE Dressing  Equipment Provided: Reachers  Assistance Level: Modified independent  Skilled Clinical Factors: pt threading BLE through underpants using reacher and increased time, able to don BLE through pants with reacher, standing with grab bars to manage over hips  Putting On/Taking Off Footwear  Equipment Provided: Sock aid (long shoehorn)  Assistance Level: Minimal assistance  Skilled Clinical Factors: pt able to cross RLE over LLE to don R sock, able to cross LLE over RLE but difficulty with donning L sock, opted to use sock aid to don L sock, assist to don CHANA hose, pt able to don B shoes using reacher and long shoehorn  Toileting  Assistance Level: Independent  Skilled Clinical Factors: voided urine x2 throughout session, pt able to manage clothing down and up over hips, able to complete toilet hygiene while seated  Transfers:   Toilet Transfers  Technique:  (ambulating)  Equipment: Standard toilet, Grab bars  Additional Factors: With handrails  Assistance Level: Modified independent  Skilled Clinical Factors: to/from RW  Tub/Shower Transfers  Type: Shower  Transfer From: Rolling walker  Transfer To: Shower chair with back  Additional Factors: Cues for hand placement, With handrails  Assistance Level: Modified independent  IADLs:  Meal Prep  Meal Prep Level: Walker (RW)  Meal Prep Level of Assistance: Supervision  Meal Preparation: Pt made oatmeal in microwave. Pt needing directions of oatmeal to be read, d/t no glasses. Pt able to use microwave w/o assist. Pt used walker basket to transport items. Pt states her spouse will do most of cooking. [employer]     Diversion     Assessment:  Assessment: Pt has met 5/6 STGs, will complete IADL task this PM, anticipate pt ronda meet 6/6 STGs. Pt is able to complete functional mobility and transfers indep with RW, bathing indep, UB dressing indep, requires assist to don CHANA hose but is otherwise indep with LB dressing and footwear. Pt has made significant process towards her goals and independence while on the rehab unit. Pt is able to recall and apply spinal precautions well during ADL tasks. Anticipate pt will discharge home with assist from her  PRN tomorrow, Friday, with home health OT. Pt will require long shoehorn, shower chair with back, TSF, half bed rail, and grab bars in the shower.   Activity Tolerance: Patient tolerated treatment well (nausea/vomiting in the shower earlier today with OT)  Discharge Recommendations: Continue to assess pending progress, Home with assist PRN, Patient would benefit from continued therapy after discharge, Home with Home health OT    Speech therapy:              Significant Diagnostics:   Lab Results   Component Value Date    CREATININE 0.8 11/17/2022    BUN 12 11/17/2022  11/17/2022    K 4.2 11/17/2022     11/17/2022    CO2 28 11/17/2022       Lab Results   Component Value Date    WBC 10.4 11/17/2022    HGB 9.7 (L) 11/17/2022    HCT 29.0 (L) 11/17/2022    MCV 89.6 11/17/2022     (H) 11/17/2022       Disposition:  home with   Services:  Pt, OT, RN  DME: shower chair, TSF, shoe horn, 1/2 bed rail    Discharge Condition: Stable    Follow-up:  See after visit summary from hospitalization    Discharge Medications:     Medication List        CHANGE how you take these medications      diazePAM 5 MG tablet  Commonly known as: VALIUM  Take 1 tablet by mouth every 12 hours as needed (muscle spasms) for up to 3 days. What changed:   when to take this  reasons to take this     HYDROcodone-acetaminophen 5-325 MG per tablet  Commonly known as: NORCO  Take 1 tablet by mouth every 6 hours as needed for Pain for up to 7 days.   What changed:   how much to take  when to take this  reasons to take this     lisinopril 40 MG tablet  Commonly known as: PRINIVIL;ZESTRIL  Take 1 tablet by mouth daily  What changed:   medication strength  how much to take     ondansetron 4 MG disintegrating tablet  Commonly known as: ZOFRAN-ODT  Take 1 tablet by mouth every 8 hours as needed for Nausea or Vomiting  What changed: reasons to take this            CONTINUE taking these medications      acetaminophen 325 MG tablet  Commonly known as: TYLENOL     Acidophilus/Goat Milk Caps     atorvastatin 10 MG tablet  Commonly known as: LIPITOR     COLLAGEN PO     dextran 70-hypromellose 0.1-0.3 % Soln opthalmic solution  Commonly known as: TEARS NATURALE     diphenhydrAMINE 25 MG capsule  Commonly known as: BENADRYL     metoprolol succinate 25 MG extended release tablet  Commonly known as: TOPROL XL     MULTIVITAMIN ADULT PO     omeprazole 20 MG delayed release capsule  Commonly known as: PRILOSEC     VITAMIN B COMPLEX PO     Vitamin D3 50 MCG (2000 UT) Caps            STOP taking these medications      polyethylene glycol 17 GM/SCOOP powder  Commonly known as: GLYCOLAX     VITAMIN D PO               Where to Get Your Medications        [facility]  [place] [address] [facility] [address]      Phone: [phone]   diazePAM 5 MG tablet  HYDROcodone-acetaminophen 5-325 MG per tablet  ondansetron 4 MG disintegrating tablet       Information about where to get these medications is not yet available    Ask your nurse or doctor about these medications  lisinopril 40 MG tablet           I spent over 35 minutes on this discharge encounter between counseling, coordination of care, and medication reconciliation. To comply with [facility] bylaw R.II.4.1:   Discharge order placed in advance to facilitate patients discharge needs.       Lacey Tucker MD

## 2022-11-18 NOTE — CARE COORDINATION
SOCIAL WORK DISCHARGE SUMMARY        DATE OF DISCHARGE:    Friday, 11-      LOCATION:    Home      Discharging to Facility/ Agency   Name: [facility]  Address:  [address] [place] [address]   Phone:  [phone]  Fax:  [phone]         TIME:     6 - 12 noon        PHARMACY:[facility]        DME:       miriam huang.     [place], [place]     Case Management   [phone]    11/18/2022  8:51 AM

## 2022-11-18 NOTE — CARE COORDINATION
Fely Sales from [facility] has accepted this referral.  W[place] Hunter     Case Management   [phone]    11/18/2022  10:38 AM

## 2022-11-18 NOTE — PROGRESS NOTES
Patient has been discharged per MD orders. Patient verbalizes understanding of all instructions, medications, and follow up. All belongings have been gathered and packed. Family at bedside to transport patient from hospital. Family to  prescription medications from personal pharmacy. Pt has personal walker with them.  Electronically signed by Scott Reich RN on 11/18/2022 at 11:05 AM

## 2022-11-18 NOTE — PLAN OF CARE
Problem: Discharge Planning  Goal: Discharge to home or other facility with appropriate resources  11/18/2022 1020 by Ambreen Olivas RN  Outcome: Progressing  Flowsheets (Taken 11/18/2022 0915)  Discharge to home or other facility with appropriate resources:   Identify barriers to discharge with patient and caregiver   Arrange for needed discharge resources and transportation as appropriate   Identify discharge learning needs (meds, wound care, etc)   Refer to discharge planning if patient needs post-hospital services based on physician order or complex needs related to functional status, cognitive ability or social support system     Problem: Safety - Adult  Goal: Free from fall injury  11/18/2022 1020 by Ambreen Olivas RN  Outcome: Progressing  Flowsheets (Taken 11/18/2022 1019)  Free From Fall Injury: Instruct family/caregiver on patient safety     Problem: ABCDS Injury Assessment  Goal: Absence of physical injury  11/18/2022 1020 by Ambreen Olivas RN  Outcome: Progressing  Flowsheets (Taken 11/18/2022 1019)  Absence of Physical Injury: Implement safety measures based on patient assessment     Problem: Pain  Goal: Verbalizes/displays adequate comfort level or baseline comfort level  11/18/2022 1020 by Ambreen Olivas RN  Outcome: Progressing  Flowsheets (Taken 11/18/2022 0915)  Verbalizes/displays adequate comfort level or baseline comfort level:   Encourage patient to monitor pain and request assistance   Assess pain using appropriate pain scale   Administer analgesics based on type and severity of pain and evaluate response   Implement non-pharmacological measures as appropriate and evaluate response     Problem: Nutrition Deficit:  Goal: Optimize nutritional status  11/18/2022 1020 by Ambreen Olivas RN  Outcome: Progressing

## 2022-11-18 NOTE — PROGRESS NOTES
Patient admitted to rehab with fusion of spine of lumbar region. A/Ox4. Transfers with walker x1. Mobility restrictions: LLE weakness. On regular diet, tolerating well. Medications taken whole with thins. On Lovenox for DVT prophylaxis. Skin: surgical incisions to lower back and abodmen. Oxygen: RA. LDA: none. Has been continent of bowel and continent of bladder. LBM 11/17/22. Chair/bed alarms in use and call light in reach. Will monitor for safety.  Electronically signed by Duc Stewart RN on 11/18/2022 at 10:23 AM

## 2023-06-17 NOTE — PROGRESS NOTES
Physical Therapy  Facility/Department: [facility]  Rehabilitation Physical Therapy Treatment Note    NAME: Pasquale Cuevas  : 1950 (67 y.o.)  MRN: [id number]  CODE STATUS: Full Code    Date of Service: 22     Restrictions:  Restrictions/Precautions: General Precautions; Fall Risk  Position Activity Restriction  Spinal Precautions: No Bending; No Lifting; No Twisting  Other position/activity restrictions: 2 incisions on lower back, 1 incision on lower stomach   Pertinent medical information:  Additional Pertinent Hx: Per Dr. Kevin Montague H&P, \"Patient is a 78-year-old female with a history of severe lumbar spinal stenosis causing low back and leg pain complaints that does not improve with conservative treatment. Patient was taken to surgery on 11/3 by Dr. Carli Dyson at [facility] where she underwent L4-S1 anterior lumbar interbody fusion with pedicle screw fixation and laminectomy and decompression procedure. Postoperatively patient was started therapies, but needs more therapy before discharge to home safely. Patient also has history of hypertension and hyperlipidemia. Patient lives at home with her  in a 1 - level house. \"    SUBJECTIVE  Subjective  Subjective: pt is sitting in w/c in therapy gym. she is feeling okay this morning, kind of tired. she is alert and pleasant. she is agreeable to PT this morning.   Pain: Denies pain,    Social/Functional History  Lives With: Spouse (, \"Reagan\")  Type of Home: House  Home Layout: One level  Home Access: Stairs to enter without rails  Entrance Stairs - Number of Steps: 1 MIRIAN to enter through doorway  Bathroom Shower/Tub: Tub/Shower unit, Shower chair without back  [employer]:  (comfort height)  Bathroom Accessibility: Walker accessible  Home Equipment: Walker, rolling, Reacher, Sock aid, Long-handled shoehorn  Has the patient had two or more falls in the past year or any fall with injury in the past year?: No  Receives Help From: Family  ADL Assistance: Independent  Homemaking Assistance: Independent  Homemaking Responsibilities: Yes  Meal Prep Responsibility: Secondary  Cleaning Responsibility: Secondary  Bill Paying/Finance Responsibility: Primary  Shopping Responsibility: Secondary  Ambulation Assistance: Independent  Transfer Assistance: Independent  Active : Yes  Occupation: Retired  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 , hairdresser, house cleaning for the elderly, 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  Leisure & Hobbies: everyday stuff, flowers, birds    OBJECTIVE  Cognition  Safety Judgement: Decreased awareness of need for safety (reminders for spinal precautions and hand placement with fxl transfers)  Problem Solving: Decreased awareness of errors  Orientation  Overall Orientation Status: Within Normal Limits    Functional Mobility  Balance  Sitting Balance: Independent  Standing Balance: Stand by assistance  Transfers  Surface: To chair with arms;From chair with arms  Additional Factors: Verbal cues; Increased time to complete  Device: Walker  Sit to Stand  Assistance Level: Stand by assist  Skilled Clinical Factors: verbal cues for hand placement  Stand to Sit  Assistance Level: Supervision  Skilled Clinical Factors: verbal cues for hand placement, forgets to reach back for arm rests of chair, cues for upright posture    Environmental Mobility  Ambulation  Surface: Level surface  Device: Rolling walker  Distance: 225' x 2  Activity: Within Unit  Activity Comments: less pain L LE  Additional Factors: Verbal cues; Increased time to complete  Assistance Level: Supervision  Gait Deviations: Slow quan;Decreased step length bilateral;Decreased weight shift bilateral;Decreased arm swing bilateral;Decreased trunk rotation  Skilled Clinical Factors: verbal cues to keep eyes/head up and look forward stand closer to the walker and to land with heel vs forefoot  Curb  Device: Rolling walker  Number of Curbs: 2  Additional Factors: Verbal cues;Non-reciprocal going up;Non-reciprocal going down  Assistance Level: Stand by assist  Skilled Clinical Factors: cued to step farther into walker to make lifting the walker easier. PT Exercises  Exercise Treatment: Seated LE exercises B: marching x 15, LAQ x 15 and AP x 15, hip abductor w orange theraband x 15, and hip adductor squeezes    ASSESSMENT/PROGRESS TOWARDS GOALS     Assessment  Assessment: pt was able to complete all exercises this session. she was able to complete 2 community level distance walks w FWW and supervision x 1. pt was also able to complete 2 curb steps today w SBA x 1. v/c are still required for safe transfers and functional mobility tasks. pt is limited by pain, decreased endurance, impaired gait, and impaired functional mobility. She will continue to benefit from skilled PT to improve her strength, endurance, gait, and functional mobility to facilitate a safe return to home w independent ADL's. plan for d/c set for 11/18 to home w assistance from family. Activity Tolerance: Patient limited by pain; Patient limited by fatigue;Patient tolerated treatment well  Discharge Recommendations: Home with assist PRN;Home with Home health PT;Patient would benefit from continued therapy after discharge  PT Equipment Recommendations  Equipment Needed: No  Other: has wheeled walker     PM session:   S: pt is sitting up in w/c in therapy gym. She is alert and pleasant. She is agreeable to PT this afternoon. She reports her pain as 4/10. O: pt was able to complete a sit to stand from w/c to FWW w SBA x 1. Pt ambulated 225' on level surface to end of hallway w SBA x 1. Upon crossing the threshold pt was pulling walker closer back to her causing pt to lose her balance to the L. Pt tried to catch herself but required assistance from physical therapist to regain balance. After that pt did not have anymore LOB.  Pt was able to complete a stand to sit from [address] to low chair w arm rest.  Pt was able to complete a sit to stand from chair w armrest to FWW w SBA x 1. Pt then ambulated 225' to therapy gym w SBA x 1 and no LOB. V/c were required to keep the walker closer and take standing rest breaks as needed. Pt then completed a stand to sit from [address] to w/c w SBA x 1. Pt next completed a stand step transfer from w/c to sitting EOB w FWW and SBA x 1. Pt then performed bed mobility (sit to supine, roll L, roll R, scooting, bridge, and supine to sit) independently. Increased time and effort were required to complete. Pt then completed a stand step transfer back form bed to w/c w FWW and SBA x 1. Pt performed seated exercises in w/c B: LAQ x 20, ankle pumps x 20, hip adductor squeeze x 20, and glute set x 20. Pt completed standing exercises: marches x 20 and heel raises x 10. Pt was left in w/c w gait belt on and brakes on and transferred to OT session. A: Pt was able to complete all exercises this session. She was able to complete 2 community level distance walks w FWW and SBA x 1. Pt was also able to complete bed mobility independently. Verbal cues are still required for safe transfers and functional mobility tasks. Pt is limited by pain, decreased endurance, impaired gait, and impaired functional mobility. She will continue to benefit from skilled PT to improve her strength, endurance, gait, and functional mobility to facilitate a safe return to home w independent ADL's. plan for D/C set for 11/18 to home w assistance from family.     Safety Device - Type of devices:  [x]  All fall risk precautions in place [] Bed alarm in place  [] Call light within reach [] Chair alarm in place [] Positioning belt [x] Gait belt [] Patient at risk for falls [] Left in bed [x] Left in chair [] Telesitter in use [] Sitter present [] Nurse notified []  None     Goals  Patient Goals   Patient Goals : Patient's goal is to go home and take care of herself  Short Term Goals  Time Frame for Short Term Goals: 7-10 days  Short Term Goal 1: transfer with MI  Short Term Goal 2: Bed mobility with MI  Short Term Goal 3: ambulate with wheeled walker 150' with MI  Short Term Goal 4: ascend/descend curb step with wheeled walker with SBA  Long Term Goals  Time Frame for Long Term Goals : STG= LTG    PLAN OF CARE/SAFETY  Physcial Therapy Plan  General Plan:  minutes of therapy at least 5 out of 7 days a week  Days Per Week: 5 Days  Hours Per Day: 1.5 hours  Therapy Duration: 10 Days (7-10 days)  Specific Instructions for Next Treatment: curb step assess  Current Treatment Recommendations: Strengthening;Balance training;Functional mobility training;Transfer training;ADL/Self-care training; Endurance training;Gait training;Stair training;Pain management;Home exercise program;Safety education & training;Patient/Caregiver education & training;Equipment evaluation, education, & procurement;Positioning; Therapeutic activities  Safety Devices  Type of Devices: Patient at risk for falls; All fall risk precautions in place;Gait belt;Left in chair (transport to room)    EDUCATION  Education  Education Given To: Patient  Education Provided: Role of Therapy;Plan of Care;Precautions; Safety;ADL Function;Mobility Training;Transfer Training;Home Exercise Program;Family Education;Equipment  Education Provided Comments: curb step  Education Method: Demonstration;Verbal;Teach Back  Barriers to Learning: None  Education Outcome: Verbalized understanding;Continued education needed;Demonstrated understanding      Therapy Time   Individual Concurrent Group Co-treatment   Time In 0900         Time Out 0945         Minutes 45           Timed Code Treatment Minutes: 45 Minutes   Second Session Therapy Time     Individual Co-treatment   Time In 1430     Time Out 1515     Minutes [address] [facility], 11/16/22 at 9:47 AM   Therapist was present, directed the patient's care, made skilled judgement, and was responsible for assessment and treatment of the patient. Warm

## 2024-08-02 ENCOUNTER — APPOINTMENT (OUTPATIENT)
Dept: GENERAL RADIOLOGY | Age: 74
End: 2024-08-02
Payer: MEDICARE

## 2024-08-02 ENCOUNTER — HOSPITAL ENCOUNTER (EMERGENCY)
Age: 74
Discharge: HOME | End: 2024-08-02
Attending: EMERGENCY MEDICINE
Payer: MEDICARE

## 2024-08-02 VITALS
BODY MASS INDEX: 26.13 KG/M2 | HEART RATE: 99 BPM | OXYGEN SATURATION: 99 % | SYSTOLIC BLOOD PRESSURE: 187 MMHG | TEMPERATURE: 98.3 F | DIASTOLIC BLOOD PRESSURE: 103 MMHG | HEIGHT: 62 IN | RESPIRATION RATE: 16 BRPM | WEIGHT: 142 LBS

## 2024-08-02 DIAGNOSIS — S52.602A RADIUS AND ULNA DISTAL FRACTURE, LEFT, CLOSED, INITIAL ENCOUNTER: ICD-10-CM

## 2024-08-02 DIAGNOSIS — S52.572A OTHER CLOSED INTRA-ARTICULAR FRACTURE OF DISTAL END OF LEFT RADIUS, INITIAL ENCOUNTER: Primary | ICD-10-CM

## 2024-08-02 DIAGNOSIS — S52.502A RADIUS AND ULNA DISTAL FRACTURE, LEFT, CLOSED, INITIAL ENCOUNTER: ICD-10-CM

## 2024-08-02 DIAGNOSIS — I10 ELEVATED BLOOD PRESSURE READING IN OFFICE WITH DIAGNOSIS OF HYPERTENSION: ICD-10-CM
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
## 2024-08-02 PROCEDURE — 73110 X-RAY EXAM OF WRIST: CPT

## 2024-08-02 PROCEDURE — 6370000000 HC RX 637 (ALT 250 FOR IP): Performed by: EMERGENCY MEDICINE

## 2024-08-02 PROCEDURE — 99283 EMERGENCY DEPT VISIT LOW MDM: CPT

## 2024-08-02 PROCEDURE — 29125 APPL SHORT ARM SPLINT STATIC: CPT

## 2024-08-02 RX ORDER — ACETAMINOPHEN 325 MG/1
650 TABLET ORAL ONCE
Status: COMPLETED | OUTPATIENT
Start: 2024-08-02 | End: 2024-08-02

## 2024-08-02 RX ORDER — IBUPROFEN 400 MG/1
400 TABLET ORAL ONCE
Status: DISCONTINUED | OUTPATIENT
Start: 2024-08-02 | End: 2024-08-03 | Stop reason: HOSPADM

## 2024-08-02 RX ADMIN — ACETAMINOPHEN 325MG 650 MG: 325 TABLET ORAL at 22:27

## 2024-08-02 ASSESSMENT — PAIN DESCRIPTION - ORIENTATION
ORIENTATION: LEFT
ORIENTATION: LEFT

## 2024-08-02 ASSESSMENT — PAIN - FUNCTIONAL ASSESSMENT: PAIN_FUNCTIONAL_ASSESSMENT: 0-10

## 2024-08-02 ASSESSMENT — PAIN DESCRIPTION - LOCATION
LOCATION: WRIST
LOCATION: WRIST

## 2024-08-02 ASSESSMENT — PAIN SCALES - GENERAL
PAINLEVEL_OUTOF10: 7
PAINLEVEL_OUTOF10: 7

## 2024-08-02 ASSESSMENT — LIFESTYLE VARIABLES
HOW MANY STANDARD DRINKS CONTAINING ALCOHOL DO YOU HAVE ON A TYPICAL DAY: PATIENT DOES NOT DRINK
HOW OFTEN DO YOU HAVE A DRINK CONTAINING ALCOHOL: NEVER

## 2024-08-02 ASSESSMENT — PAIN DESCRIPTION - FREQUENCY: FREQUENCY: CONTINUOUS

## 2024-08-02 ASSESSMENT — PAIN DESCRIPTION - DESCRIPTORS: DESCRIPTORS: ACHING

## 2024-08-02 ASSESSMENT — PAIN DESCRIPTION - PAIN TYPE: TYPE: ACUTE PAIN

## 2024-08-03 NOTE — ED NOTES
Wound care provided to patient's right posterior forearm. Small abrasion noted with minimal bleeding. Non adherent dressing applied and wrapped. Homemade splint removed from patient's left forearm for provider examination. Provided with pillow support for affected side.

## 2024-08-03 NOTE — DISCHARGE INSTRUCTIONS
You have a distal radius and ulna fracture which is a fancy way of saying you have broken your wrist.  We have placed you in a splint and need you to call to schedule follow-up with orthopedics.  In the interim you can take Motrin and Tylenol for pain, make sure you are keeping the extremity elevated in order to help with any swelling.    Return to ED for new or worsening symptoms or concerns.

## 2024-08-03 NOTE — ED NOTES
Pt was fitted for a left lower arm sugar tong splint. CMS intact before and after splinting. Pt was placed in a 2in plaster splint. Pt tolerated procedure well. Pt verbalized understanding of care/usage of splint. Sling provided, however provider stated to pt that it was not a necessity to wear, it is for comfort and to elevate arm.

## 2024-08-03 NOTE — ED PROVIDER NOTES
[facility] EMERGENCY DEPARTMENT  EMERGENCY DEPARTMENT ENCOUNTER        Pt Name: Lisha Ni  MRN: [id number]  Birthdate 1950  Date of evaluation: 8/2/2024  Provider: Shikha Zamora MD  PCP: Lyssa Rouse MD  Note Started: 9:53 PM EDT 8/2/24    CHIEF COMPLAINT     I tripped and fell  HISTORY OF PRESENT ILLNESS: 1 or more Elements     Chief Complaint   Patient presents with    Fall     Pt states that she tripped and landed on left hand earlier today. Pt denies hitting head. Pt denies LOC. Pt states that she has pain in left wrist. Pt has cut on right elbow. Pt AAO x 4. VSS.      History from : Patient and Family  at bedside  Limitations to history : None    Lisha Ni is a 74 y.o. female who presents to the emergency department secondary to concern for left wrist pain after a fall.  She reports that she was on the porch and she tripped on her own feet.  She states that she has previously broken her right wrist but not her left arm.  She is right-handed at baseline.  She denies hitting her head or injuring any other part of her body apart from a small abrasion to her elbow.  She denies any numbness.  Did not take any pain medication prior to arrival.  She is still wearing her wedding ring, reports they have been  51 years.  Pain is located in her wrist, she rates it as moderate 7 out of 10, worse with certain movements or palpation.    Past medical history noted below.  History of smoking.  Aside from what is stated above denies any other symptoms or modifying factors.    Nursing Notes were all reviewed and agreed with or any disagreements addressed in HPI/MDM.  REVIEW OF SYSTEMS :    Review of Systems Pertinent positive and negative findings as documented in the HPI  PAST MEDICAL HISTORY     Past Medical History:   Diagnosis Date    Hypertension     PONV (postoperative nausea and vomiting)        SURGICALHISTORY       Past Surgical History:   Procedure Laterality  Date    CYST REMOVAL      TONSILLECTOMY      TUBAL LIGATION       CURRENT MEDICATIONS       Previous Medications    ACETAMINOPHEN (TYLENOL) 325 MG TABLET    Take 650 mg by mouth every 4 hours as needed    ATORVASTATIN (LIPITOR) 10 MG TABLET    Take 10 mg by mouth at bedtime    B COMPLEX VITAMINS (VITAMIN B COMPLEX PO)    Take 1 tablet by mouth daily    CHOLECALCIFEROL (VITAMIN D3) 2000 UNITS CAPS    Take 1,000 Units by mouth daily    COLLAGEN PO    Take 1 capsule by mouth daily    DEXTRAN 70-HYPROMELLOSE (TEARS NATURALE) 0.1-0.3 % SOLN OPTHALMIC SOLUTION    Apply 1 drop to eye 2 times daily    DIPHENHYDRAMINE (BENADRYL) 25 MG CAPSULE    Take 25 mg by mouth nightly as needed    LISINOPRIL (PRINIVIL;ZESTRIL) 40 MG TABLET    Take 1 tablet by mouth daily    METOPROLOL SUCCINATE (TOPROL XL) 25 MG EXTENDED RELEASE TABLET    Take 25 mg by mouth    MULTIPLE VITAMINS-MINERALS (MULTIVITAMIN ADULT PO)    Take 1 tablet by mouth daily    OMEPRAZOLE (PRILOSEC) 20 MG DELAYED RELEASE CAPSULE    Take 20 mg by mouth daily    PROBIOTIC PRODUCT (ACIDOPHILUS/GOAT MILK) CAPS    Take 1 capsule by mouth daily      ALLERGIES     Amoxicillin and Doxycycline  FAMILY HISTORY     History reviewed. No pertinent family history.  SOCIAL HISTORY       Social History     Socioeconomic History    Marital status:      Spouse name: None    Number of children: None    Years of education: None    Highest education level: None   Tobacco Use    Smoking status: Former     Current packs/day: 0.00     Types: Cigarettes     Quit date: 2004     Years since quittin.6    Smokeless tobacco: Never   Substance and Sexual Activity    Alcohol use: No    Drug use: No     SCREENINGS        [place] Coma Scale  Eye Opening: Spontaneous  Best Verbal Response: Oriented  Best Motor Response: Obeys commands  [place] Coma Scale Score: 15                  CIWA Assessment  BP: (!) 151/104  Pulse: 72         PHYSICAL EXAM  1 or more Elements   INITIAL VITALS: BP:

## 2025-04-03 NOTE — ED TRIAGE NOTES
Addended by: FELICIA ROBERT on: 4/3/2025 11:08 AM     Modules accepted: Orders     Pt states that she tripped and landed on left hand earlier today. Pt denies hitting head. Pt denies LOC. Pt states that she has pain in left wrist. Pt has cut on right elbow. Pt AAO x 4. VSS.